# Patient Record
Sex: FEMALE | Race: BLACK OR AFRICAN AMERICAN | Employment: STUDENT | URBAN - METROPOLITAN AREA
[De-identification: names, ages, dates, MRNs, and addresses within clinical notes are randomized per-mention and may not be internally consistent; named-entity substitution may affect disease eponyms.]

---
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
## 2021-01-09 ENCOUNTER — HEALTH MAINTENANCE LETTER (OUTPATIENT)
Age: 21
End: 2021-01-09

## 2021-01-25 ENCOUNTER — APPOINTMENT (OUTPATIENT)
Dept: URGENT CARE | Facility: CLINIC | Age: 21
End: 2021-01-25
Payer: COMMERCIAL

## 2021-01-26 ENCOUNTER — HOSPITAL ENCOUNTER (OUTPATIENT)
Facility: CLINIC | Age: 21
Setting detail: OBSERVATION
Discharge: HOME | End: 2021-01-27
Attending: PHYSICIAN ASSISTANT | Admitting: SURGERY
Payer: COMMERCIAL

## 2021-01-26 ENCOUNTER — ANESTHESIA (OUTPATIENT)
Dept: SURGERY | Facility: CLINIC | Age: 21
End: 2021-01-26
Payer: COMMERCIAL

## 2021-01-26 ENCOUNTER — APPOINTMENT (OUTPATIENT)
Dept: CT IMAGING | Facility: CLINIC | Age: 21
End: 2021-01-26
Attending: PHYSICIAN ASSISTANT
Payer: COMMERCIAL

## 2021-01-26 ENCOUNTER — ANESTHESIA EVENT (OUTPATIENT)
Dept: SURGERY | Facility: CLINIC | Age: 21
End: 2021-01-26
Payer: COMMERCIAL

## 2021-01-26 DIAGNOSIS — M46.28 ABSCESS OF SACRUM (H): ICD-10-CM

## 2021-01-26 DIAGNOSIS — L05.01 PILONIDAL ABSCESS: Primary | ICD-10-CM

## 2021-01-26 LAB
ANION GAP SERPL CALCULATED.3IONS-SCNC: 7 MMOL/L (ref 3–14)
B-HCG FREE SERPL-ACNC: <5 IU/L
BACTERIA SPEC CULT: NORMAL
BACTERIA SPEC CULT: NORMAL
BASOPHILS # BLD AUTO: 0 10E9/L (ref 0–0.2)
BASOPHILS NFR BLD AUTO: 0.1 %
BUN SERPL-MCNC: 5 MG/DL (ref 7–30)
CALCIUM SERPL-MCNC: 8.7 MG/DL (ref 8.5–10.1)
CHLORIDE SERPL-SCNC: 105 MMOL/L (ref 96–110)
CO2 SERPL-SCNC: 24 MMOL/L (ref 20–32)
CREAT SERPL-MCNC: 0.54 MG/DL (ref 0.5–1)
DIFFERENTIAL METHOD BLD: ABNORMAL
EOSINOPHIL # BLD AUTO: 0 10E9/L (ref 0–0.7)
EOSINOPHIL NFR BLD AUTO: 0.1 %
ERYTHROCYTE [DISTWIDTH] IN BLOOD BY AUTOMATED COUNT: 13 % (ref 10–15)
GFR SERPL CREATININE-BSD FRML MDRD: >90 ML/MIN/{1.73_M2}
GLUCOSE SERPL-MCNC: 112 MG/DL (ref 70–99)
GRAM STN SPEC: ABNORMAL
HCT VFR BLD AUTO: 30.4 % (ref 35–47)
HGB BLD-MCNC: 9.9 G/DL (ref 11.7–15.7)
IMM GRANULOCYTES # BLD: 0.1 10E9/L (ref 0–0.4)
IMM GRANULOCYTES NFR BLD: 0.6 %
LABORATORY COMMENT REPORT: NORMAL
LACTATE BLD-SCNC: 0.6 MMOL/L (ref 0.7–2)
LYMPHOCYTES # BLD AUTO: 1.7 10E9/L (ref 0.8–5.3)
LYMPHOCYTES NFR BLD AUTO: 11.1 %
Lab: ABNORMAL
MCH RBC QN AUTO: 28.7 PG (ref 26.5–33)
MCHC RBC AUTO-ENTMCNC: 32.6 G/DL (ref 31.5–36.5)
MCV RBC AUTO: 88 FL (ref 78–100)
MONOCYTES # BLD AUTO: 1.6 10E9/L (ref 0–1.3)
MONOCYTES NFR BLD AUTO: 10.1 %
NEUTROPHILS # BLD AUTO: 12.2 10E9/L (ref 1.6–8.3)
NEUTROPHILS NFR BLD AUTO: 78 %
NRBC # BLD AUTO: 0 10*3/UL
NRBC BLD AUTO-RTO: 0 /100
PLATELET # BLD AUTO: 243 10E9/L (ref 150–450)
POTASSIUM SERPL-SCNC: 3.7 MMOL/L (ref 3.4–5.3)
RBC # BLD AUTO: 3.45 10E12/L (ref 3.8–5.2)
SARS-COV-2 RNA RESP QL NAA+PROBE: NEGATIVE
SODIUM SERPL-SCNC: 136 MMOL/L (ref 133–144)
SPECIMEN SOURCE: ABNORMAL
SPECIMEN SOURCE: NORMAL
SPECIMEN SOURCE: NORMAL
WBC # BLD AUTO: 15.6 10E9/L (ref 4–11)

## 2021-01-26 PROCEDURE — 87070 CULTURE OTHR SPECIMN AEROBIC: CPT | Performed by: SURGERY

## 2021-01-26 PROCEDURE — 83605 ASSAY OF LACTIC ACID: CPT | Performed by: PHYSICIAN ASSISTANT

## 2021-01-26 PROCEDURE — 72193 CT PELVIS W/DYE: CPT

## 2021-01-26 PROCEDURE — 85025 COMPLETE CBC W/AUTO DIFF WBC: CPT | Performed by: PHYSICIAN ASSISTANT

## 2021-01-26 PROCEDURE — 250N000013 HC RX MED GY IP 250 OP 250 PS 637: Performed by: SURGERY

## 2021-01-26 PROCEDURE — 96376 TX/PRO/DX INJ SAME DRUG ADON: CPT

## 2021-01-26 PROCEDURE — 87040 BLOOD CULTURE FOR BACTERIA: CPT | Performed by: PHYSICIAN ASSISTANT

## 2021-01-26 PROCEDURE — G0378 HOSPITAL OBSERVATION PER HR: HCPCS

## 2021-01-26 PROCEDURE — 250N000009 HC RX 250: Performed by: PHYSICIAN ASSISTANT

## 2021-01-26 PROCEDURE — 87635 SARS-COV-2 COVID-19 AMP PRB: CPT | Performed by: PHYSICIAN ASSISTANT

## 2021-01-26 PROCEDURE — 87077 CULTURE AEROBIC IDENTIFY: CPT | Performed by: SURGERY

## 2021-01-26 PROCEDURE — 87075 CULTR BACTERIA EXCEPT BLOOD: CPT | Performed by: SURGERY

## 2021-01-26 PROCEDURE — 258N000003 HC RX IP 258 OP 636: Performed by: SURGERY

## 2021-01-26 PROCEDURE — 250N000011 HC RX IP 250 OP 636: Performed by: PHYSICIAN ASSISTANT

## 2021-01-26 PROCEDURE — 250N000011 HC RX IP 250 OP 636: Performed by: NURSE ANESTHETIST, CERTIFIED REGISTERED

## 2021-01-26 PROCEDURE — 80048 BASIC METABOLIC PNL TOTAL CA: CPT | Performed by: PHYSICIAN ASSISTANT

## 2021-01-26 PROCEDURE — 96365 THER/PROPH/DIAG IV INF INIT: CPT

## 2021-01-26 PROCEDURE — 258N000003 HC RX IP 258 OP 636: Performed by: NURSE ANESTHETIST, CERTIFIED REGISTERED

## 2021-01-26 PROCEDURE — 99285 EMERGENCY DEPT VISIT HI MDM: CPT | Mod: 25

## 2021-01-26 PROCEDURE — 370N000017 HC ANESTHESIA TECHNICAL FEE, PER MIN: Performed by: SURGERY

## 2021-01-26 PROCEDURE — 710N000009 HC RECOVERY PHASE 1, LEVEL 1, PER MIN: Performed by: SURGERY

## 2021-01-26 PROCEDURE — 87205 SMEAR GRAM STAIN: CPT | Performed by: SURGERY

## 2021-01-26 PROCEDURE — 87070 CULTURE OTHR SPECIMN AEROBIC: CPT | Mod: XU | Performed by: SURGERY

## 2021-01-26 PROCEDURE — 272N000001 HC OR GENERAL SUPPLY STERILE: Performed by: SURGERY

## 2021-01-26 PROCEDURE — 360N000075 HC SURGERY LEVEL 2, PER MIN: Performed by: SURGERY

## 2021-01-26 PROCEDURE — C9803 HOPD COVID-19 SPEC COLLECT: HCPCS

## 2021-01-26 PROCEDURE — 250N000025 HC SEVOFLURANE, PER MIN: Performed by: SURGERY

## 2021-01-26 PROCEDURE — 99203 OFFICE O/P NEW LOW 30 MIN: CPT | Mod: 57 | Performed by: SURGERY

## 2021-01-26 PROCEDURE — 250N000011 HC RX IP 250 OP 636: Performed by: SURGERY

## 2021-01-26 PROCEDURE — 84702 CHORIONIC GONADOTROPIN TEST: CPT

## 2021-01-26 PROCEDURE — 250N000009 HC RX 250: Performed by: SURGERY

## 2021-01-26 PROCEDURE — 999N000141 HC STATISTIC PRE-PROCEDURE NURSING ASSESSMENT: Performed by: SURGERY

## 2021-01-26 PROCEDURE — 120N000001 HC R&B MED SURG/OB

## 2021-01-26 PROCEDURE — 250N000009 HC RX 250: Performed by: NURSE ANESTHETIST, CERTIFIED REGISTERED

## 2021-01-26 RX ORDER — SODIUM CHLORIDE, SODIUM LACTATE, POTASSIUM CHLORIDE, CALCIUM CHLORIDE 600; 310; 30; 20 MG/100ML; MG/100ML; MG/100ML; MG/100ML
INJECTION, SOLUTION INTRAVENOUS CONTINUOUS
Status: DISCONTINUED | OUTPATIENT
Start: 2021-01-26 | End: 2021-01-26 | Stop reason: HOSPADM

## 2021-01-26 RX ORDER — PIPERACILLIN SODIUM, TAZOBACTAM SODIUM 4; .5 G/20ML; G/20ML
4.5 INJECTION, POWDER, LYOPHILIZED, FOR SOLUTION INTRAVENOUS ONCE
Status: COMPLETED | OUTPATIENT
Start: 2021-01-26 | End: 2021-01-26

## 2021-01-26 RX ORDER — PIPERACILLIN SODIUM, TAZOBACTAM SODIUM 3; .375 G/15ML; G/15ML
3.38 INJECTION, POWDER, LYOPHILIZED, FOR SOLUTION INTRAVENOUS EVERY 6 HOURS
Status: DISCONTINUED | OUTPATIENT
Start: 2021-01-26 | End: 2021-01-27 | Stop reason: HOSPADM

## 2021-01-26 RX ORDER — DOXYCYCLINE HYCLATE 100 MG
100 TABLET ORAL 2 TIMES DAILY PRN
COMMUNITY

## 2021-01-26 RX ORDER — ONDANSETRON 2 MG/ML
INJECTION INTRAMUSCULAR; INTRAVENOUS PRN
Status: DISCONTINUED | OUTPATIENT
Start: 2021-01-26 | End: 2021-01-26

## 2021-01-26 RX ORDER — HYDROMORPHONE HYDROCHLORIDE 1 MG/ML
0.3 INJECTION, SOLUTION INTRAMUSCULAR; INTRAVENOUS; SUBCUTANEOUS
Status: DISCONTINUED | OUTPATIENT
Start: 2021-01-26 | End: 2021-01-27 | Stop reason: HOSPADM

## 2021-01-26 RX ORDER — ACETAMINOPHEN 325 MG/1
650 TABLET ORAL EVERY 4 HOURS PRN
Status: DISCONTINUED | OUTPATIENT
Start: 2021-01-29 | End: 2021-01-27 | Stop reason: HOSPADM

## 2021-01-26 RX ORDER — AMOXICILLIN 250 MG
1 CAPSULE ORAL 2 TIMES DAILY
Status: DISCONTINUED | OUTPATIENT
Start: 2021-01-26 | End: 2021-01-27 | Stop reason: HOSPADM

## 2021-01-26 RX ORDER — MAGNESIUM HYDROXIDE 1200 MG/15ML
LIQUID ORAL PRN
Status: DISCONTINUED | OUTPATIENT
Start: 2021-01-26 | End: 2021-01-26 | Stop reason: HOSPADM

## 2021-01-26 RX ORDER — DIPHENHYDRAMINE HCL 25 MG
25 CAPSULE ORAL EVERY 6 HOURS PRN
Status: DISCONTINUED | OUTPATIENT
Start: 2021-01-26 | End: 2021-01-27 | Stop reason: HOSPADM

## 2021-01-26 RX ORDER — NALOXONE HYDROCHLORIDE 0.4 MG/ML
0.4 INJECTION, SOLUTION INTRAMUSCULAR; INTRAVENOUS; SUBCUTANEOUS
Status: DISCONTINUED | OUTPATIENT
Start: 2021-01-26 | End: 2021-01-27 | Stop reason: HOSPADM

## 2021-01-26 RX ORDER — FENTANYL CITRATE 50 UG/ML
INJECTION, SOLUTION INTRAMUSCULAR; INTRAVENOUS PRN
Status: DISCONTINUED | OUTPATIENT
Start: 2021-01-26 | End: 2021-01-26

## 2021-01-26 RX ORDER — IBUPROFEN 600 MG/1
600 TABLET, FILM COATED ORAL EVERY 6 HOURS PRN
Status: DISCONTINUED | OUTPATIENT
Start: 2021-01-26 | End: 2021-01-27 | Stop reason: HOSPADM

## 2021-01-26 RX ORDER — ONDANSETRON 4 MG/1
4 TABLET, ORALLY DISINTEGRATING ORAL EVERY 30 MIN PRN
Status: DISCONTINUED | OUTPATIENT
Start: 2021-01-26 | End: 2021-01-26 | Stop reason: HOSPADM

## 2021-01-26 RX ORDER — DIPHENHYDRAMINE HYDROCHLORIDE 50 MG/ML
25 INJECTION INTRAMUSCULAR; INTRAVENOUS EVERY 6 HOURS PRN
Status: DISCONTINUED | OUTPATIENT
Start: 2021-01-26 | End: 2021-01-27 | Stop reason: HOSPADM

## 2021-01-26 RX ORDER — ONDANSETRON 4 MG/1
4 TABLET, ORALLY DISINTEGRATING ORAL EVERY 6 HOURS PRN
Status: DISCONTINUED | OUTPATIENT
Start: 2021-01-26 | End: 2021-01-27 | Stop reason: HOSPADM

## 2021-01-26 RX ORDER — SODIUM CHLORIDE, SODIUM LACTATE, POTASSIUM CHLORIDE, CALCIUM CHLORIDE 600; 310; 30; 20 MG/100ML; MG/100ML; MG/100ML; MG/100ML
INJECTION, SOLUTION INTRAVENOUS CONTINUOUS
Status: DISCONTINUED | OUTPATIENT
Start: 2021-01-26 | End: 2021-01-27

## 2021-01-26 RX ORDER — PROPOFOL 10 MG/ML
INJECTION, EMULSION INTRAVENOUS PRN
Status: DISCONTINUED | OUTPATIENT
Start: 2021-01-26 | End: 2021-01-26

## 2021-01-26 RX ORDER — FENTANYL CITRATE 50 UG/ML
25-50 INJECTION, SOLUTION INTRAMUSCULAR; INTRAVENOUS
Status: DISCONTINUED | OUTPATIENT
Start: 2021-01-26 | End: 2021-01-26 | Stop reason: HOSPADM

## 2021-01-26 RX ORDER — ACETAMINOPHEN 325 MG/1
975 TABLET ORAL EVERY 8 HOURS
Status: DISCONTINUED | OUTPATIENT
Start: 2021-01-26 | End: 2021-01-27 | Stop reason: HOSPADM

## 2021-01-26 RX ORDER — NALOXONE HYDROCHLORIDE 0.4 MG/ML
0.2 INJECTION, SOLUTION INTRAMUSCULAR; INTRAVENOUS; SUBCUTANEOUS
Status: DISCONTINUED | OUTPATIENT
Start: 2021-01-26 | End: 2021-01-27 | Stop reason: HOSPADM

## 2021-01-26 RX ORDER — ONDANSETRON 2 MG/ML
4 INJECTION INTRAMUSCULAR; INTRAVENOUS EVERY 6 HOURS PRN
Status: DISCONTINUED | OUTPATIENT
Start: 2021-01-26 | End: 2021-01-27 | Stop reason: HOSPADM

## 2021-01-26 RX ORDER — OXYCODONE HYDROCHLORIDE 5 MG/1
5-10 TABLET ORAL
Status: DISCONTINUED | OUTPATIENT
Start: 2021-01-26 | End: 2021-01-27 | Stop reason: HOSPADM

## 2021-01-26 RX ORDER — LIDOCAINE HYDROCHLORIDE 20 MG/ML
INJECTION, SOLUTION INFILTRATION; PERINEURAL PRN
Status: DISCONTINUED | OUTPATIENT
Start: 2021-01-26 | End: 2021-01-26

## 2021-01-26 RX ORDER — AMOXICILLIN 250 MG
2 CAPSULE ORAL 2 TIMES DAILY
Status: DISCONTINUED | OUTPATIENT
Start: 2021-01-26 | End: 2021-01-27 | Stop reason: HOSPADM

## 2021-01-26 RX ORDER — IOPAMIDOL 755 MG/ML
71 INJECTION, SOLUTION INTRAVASCULAR ONCE
Status: COMPLETED | OUTPATIENT
Start: 2021-01-26 | End: 2021-01-26

## 2021-01-26 RX ORDER — HYDROMORPHONE HYDROCHLORIDE 1 MG/ML
.3-.5 INJECTION, SOLUTION INTRAMUSCULAR; INTRAVENOUS; SUBCUTANEOUS EVERY 5 MIN PRN
Status: DISCONTINUED | OUTPATIENT
Start: 2021-01-26 | End: 2021-01-26 | Stop reason: HOSPADM

## 2021-01-26 RX ORDER — DEXAMETHASONE SODIUM PHOSPHATE 4 MG/ML
INJECTION, SOLUTION INTRA-ARTICULAR; INTRALESIONAL; INTRAMUSCULAR; INTRAVENOUS; SOFT TISSUE PRN
Status: DISCONTINUED | OUTPATIENT
Start: 2021-01-26 | End: 2021-01-26

## 2021-01-26 RX ORDER — LIDOCAINE 40 MG/G
CREAM TOPICAL
Status: DISCONTINUED | OUTPATIENT
Start: 2021-01-26 | End: 2021-01-27 | Stop reason: HOSPADM

## 2021-01-26 RX ORDER — ONDANSETRON 2 MG/ML
4 INJECTION INTRAMUSCULAR; INTRAVENOUS EVERY 30 MIN PRN
Status: DISCONTINUED | OUTPATIENT
Start: 2021-01-26 | End: 2021-01-26 | Stop reason: HOSPADM

## 2021-01-26 RX ADMIN — PHENYLEPHRINE HYDROCHLORIDE 100 MCG: 10 INJECTION INTRAVENOUS at 17:37

## 2021-01-26 RX ADMIN — PHENYLEPHRINE HYDROCHLORIDE 100 MCG: 10 INJECTION INTRAVENOUS at 17:29

## 2021-01-26 RX ADMIN — PIPERACILLIN SODIUM AND TAZOBACTAM SODIUM 4.5 G: 4; .5 INJECTION, POWDER, LYOPHILIZED, FOR SOLUTION INTRAVENOUS at 15:53

## 2021-01-26 RX ADMIN — DEXAMETHASONE SODIUM PHOSPHATE 4 MG: 4 INJECTION, SOLUTION INTRA-ARTICULAR; INTRALESIONAL; INTRAMUSCULAR; INTRAVENOUS; SOFT TISSUE at 17:39

## 2021-01-26 RX ADMIN — PHENYLEPHRINE HYDROCHLORIDE 100 MCG: 10 INJECTION INTRAVENOUS at 17:25

## 2021-01-26 RX ADMIN — MIDAZOLAM 2 MG: 1 INJECTION INTRAMUSCULAR; INTRAVENOUS at 17:10

## 2021-01-26 RX ADMIN — SODIUM CHLORIDE 61 ML: 9 INJECTION, SOLUTION INTRAVENOUS at 14:36

## 2021-01-26 RX ADMIN — LIDOCAINE HYDROCHLORIDE 80 MG: 20 INJECTION, SOLUTION INFILTRATION; PERINEURAL at 17:12

## 2021-01-26 RX ADMIN — ONDANSETRON 4 MG: 2 INJECTION INTRAMUSCULAR; INTRAVENOUS at 17:43

## 2021-01-26 RX ADMIN — SUCCINYLCHOLINE CHLORIDE 100 MG: 20 INJECTION, SOLUTION INTRAMUSCULAR; INTRAVENOUS; PARENTERAL at 17:12

## 2021-01-26 RX ADMIN — ACETAMINOPHEN 975 MG: 325 TABLET, FILM COATED ORAL at 19:28

## 2021-01-26 RX ADMIN — IOPAMIDOL 71 ML: 755 INJECTION, SOLUTION INTRAVENOUS at 14:36

## 2021-01-26 RX ADMIN — PROPOFOL 200 MG: 10 INJECTION, EMULSION INTRAVENOUS at 17:12

## 2021-01-26 RX ADMIN — PIPERACILLIN SODIUM AND TAZOBACTAM SODIUM 3.38 G: 3; .375 INJECTION, POWDER, LYOPHILIZED, FOR SOLUTION INTRAVENOUS at 22:08

## 2021-01-26 RX ADMIN — SODIUM CHLORIDE, POTASSIUM CHLORIDE, SODIUM LACTATE AND CALCIUM CHLORIDE: 600; 310; 30; 20 INJECTION, SOLUTION INTRAVENOUS at 19:29

## 2021-01-26 RX ADMIN — FENTANYL CITRATE 100 MCG: 50 INJECTION, SOLUTION INTRAMUSCULAR; INTRAVENOUS at 17:12

## 2021-01-26 RX ADMIN — PHENYLEPHRINE HYDROCHLORIDE 100 MCG: 10 INJECTION INTRAVENOUS at 17:32

## 2021-01-26 RX ADMIN — SODIUM CHLORIDE, POTASSIUM CHLORIDE, SODIUM LACTATE AND CALCIUM CHLORIDE: 600; 310; 30; 20 INJECTION, SOLUTION INTRAVENOUS at 16:54

## 2021-01-26 ASSESSMENT — ENCOUNTER SYMPTOMS
FEVER: 0
CHILLS: 0
COLOR CHANGE: 1

## 2021-01-26 ASSESSMENT — MIFFLIN-ST. JEOR
SCORE: 1451.74
SCORE: 1468.63

## 2021-01-26 ASSESSMENT — ACTIVITIES OF DAILY LIVING (ADL): ADLS_ACUITY_SCORE: 15

## 2021-01-26 NOTE — OR NURSING
Report received from FABBY Pagan RN. Pt to transfer over with zosyn infusion running to [facility].

## 2021-01-26 NOTE — H&P
"[facility] General Surgery H&P    Luisana Patterson MRN# [id number]   YOB: 2000 Age: 20 year old      Date of Admission:  1/26/2021  Date of Consult: 1/26/2021         Assessment and Plan:   Patient is a 20 year old female with an enlarging abscess on her lower back and history of pilonidal abscess. CT confirmed a 7cm underlying fluid collection consistent with pilonidal abscess. We discussed options and I recommend emergent excision and drainage of the abscess with irrigation and wound packing. Plan for admission following surgery for IV abx due to surrounding cellulitis and wound cares.     PLAN:  Surgery tonight  Ok for diet post operatively  IV abx  Cultures pending drainage  Wound cares with wound packing  Will likely need SW consult to determine if qualifies for home health RN for dressing changes         Requesting Physician:      Fatuma Crockett PA-C        Chief Complaint:   Pilonidal abscess          History of Present Illness:   Luisana Patterson is a 20 year old female who was seen in consultation at the request of Fatuma Crockett PA-C  who presented with a large pilonidal abscess. Pt reports that she began noticing some discomfort on her lower back about 6 days ago. She then noticed swelling in the area a couple days ago. It became more painful and she presented to the ER for evaluation. She is currently a student at [employer] and is from [place]. She has a history of prior pilonidal abscess in 2018 for which she underwent incision and drainage with wound packing at that time. She has not other prior surgeries. She is otherwise very healthy. She reports significant pain on her lower back/upper buttock and pain with walking.  Denies fevers but has felt feverish.           Physical Exam:   Blood pressure 110/68, pulse 120, temperature 98.9  F (37.2  C), temperature source Temporal, resp. rate 14, height 1.702 m (5' 7\"), weight 64.4 kg (142 lb), last menstrual period 01/09/2021, " SpO2 96 %.  142 lbs 0 oz  General: lying on her side in bed, appears uncomfortable  Psych: Alert and Oriented.  Normal affect  Neurological: grossly intact  Eyes: Sclera clear  Respiratory:  nonlabored breathing  Cardiovascular:  Regular Rate and Rhythm   GI: soft, nt, nt   Lymphatic/Hematologic/Immune:  No femoral or cervical lymphadenopathy.  Integumentary: area of significant erythema with underlying fluctuance noted above gluteal cleft at site of prior pilonidal abscess surgical scar with surrounding induration c/w cellulitis, fluctuance is 7-8cm on exam. Overlying skin is shiny/marco a in appearance.          Past Medical History:   History reviewed. No pertinent past medical history.         Past Surgical History:     Past Surgical History:   Procedure Laterality Date     EXCISE HIDRADENITIS (LOCATION) Bilateral 06/11/2020            Current Medications:           piperacillin-tazobactam  3.375 g Intravenous Q6H       diphenhydrAMINE **OR** diphenhydrAMINE, HYDROmorphone         Home Medications:     Prior to Admission medications    Medication Sig Last Dose Taking? Auth Provider   adalimumab (HUMIRA) 40 MG/0.8ML prefilled syringe kit Inject 40 mg Subcutaneous every 7 days On Fridays 1/25/2021 Yes Reported, Patient   diltiazem 2% in PLO gel Apply topically daily as needed Past Month at Unknown time Yes Unknown, Entered By History   doxycycline hyclate (VIBRA-TABS) 100 MG tablet Take 100 mg by mouth 2 times daily as needed 1/25/2021 at Unknown time Yes Reported, Patient   tretinoin (RETIN-A) 0.05 % external cream Apply topically nightly as needed  Past Month at Unknown time Yes Reported, Patient   SUMAtriptan (IMITREX) 50 MG tablet Take 50 mg by mouth at onset of headache Prescribed but never used it yet. not started at Unknown time  Reported, Patient            Allergies:   No Known Allergies         Family History:   History reviewed. No pertinent family history.        Social History:   Luisana Patterson  reports  that she has never smoked. She has never used smokeless tobacco. She reports that she does not drink alcohol or use drugs.          Review of Systems:   The 10 point Review of Systems is negative other than noted in the HPI.         Labs/Imaging   All new lab and imaging data was reviewed.   I have personally reviewed the imaging studies including her CT.         Sarah Ramsay MD

## 2021-01-26 NOTE — PHARMACY-ADMISSION MEDICATION HISTORY
Pharmacy Medication History  Admission medication history interview status for the 1/26/2021  admission is complete. See EPIC admission navigator for prior to admission medications     Location of Interview: Phone  Medication history sources: Patient  Medication history source reliability: Good  Adherence assessment: Good    Significant changes made to the medication list:      In the past week, patient estimated taking medication this percent of the time: greater than 90%    Additional medication history information:       Medication reconciliation completed by provider prior to medication history? No    Time spent in this activity: 5 min      Prior to Admission medications    Medication Sig Last Dose Taking? Auth Provider   adalimumab (HUMIRA) 40 MG/0.8ML prefilled syringe kit Inject 40 mg Subcutaneous every 7 days On Fridays 1/25/2021 Yes Reported, Patient   diltiazem 2% in PLO gel Apply topically daily as needed Past Month at Unknown time Yes Unknown, Entered By History   tretinoin (RETIN-A) 0.05 % external cream Apply topically nightly as needed  Past Month at Unknown time Yes Reported, Patient   SUMAtriptan (IMITREX) 50 MG tablet Take 50 mg by mouth at onset of headache Prescribed but never used it yet. not started at Unknown time  Reported, Patient

## 2021-01-26 NOTE — ANESTHESIA PREPROCEDURE EVALUATION
Anesthesia Pre-Procedure Evaluation    Patient: Luisana Patterson   MRN: [id number] : 2000        Preoperative Diagnosis: Pilonidal abscess [L05.01]   Procedure : Procedure(s):  EXCISION AND DRAINAGE, PILONIDAL ABCESS     History reviewed. No pertinent past medical history.   Past Surgical History:   Procedure Laterality Date     EXCISE HIDRADENITIS (LOCATION) Bilateral 2020      No Known Allergies   Social History     Tobacco Use     Smoking status: Never Smoker     Smokeless tobacco: Never Used   Substance Use Topics     Alcohol use: Never     Frequency: Never     Binge frequency: Never      Wt Readings from Last 1 Encounters:   21 64.4 kg (142 lb) (73 %, Z= 0.61)*     * Growth percentiles are based on CDC (Girls, 2-20 Years) data.        Anesthesia Evaluation            ROS/MED HX  ENT/Pulmonary:  - neg pulmonary ROS   (+) sleep apnea,     Neurologic:  - neg neurologic ROS     Cardiovascular:  - neg cardiovascular ROS     METS/Exercise Tolerance:     Hematologic:  - neg hematologic  ROS     Musculoskeletal:  - neg musculoskeletal ROS     GI/Hepatic:  - neg GI/hepatic ROS  (-) GERD   Renal/Genitourinary:  - neg Renal ROS     Endo:       Psychiatric/Substance Use:  - neg psychiatric ROS     Infectious Disease: Comment: PILONIDAL CYST      Malignancy:       Other:            Physical Exam    Airway        Mallampati: II   TM distance: > 3 FB   Neck ROM: full   Mouth opening: > 3 cm    Respiratory Devices and Support         Dental  no notable dental history         Cardiovascular   cardiovascular exam normal          Pulmonary   pulmonary exam normal                OUTSIDE LABS:  CBC:   Lab Results   Component Value Date    WBC 15.6 (H) 2021    HGB 9.9 (L) 2021    HCT 30.4 (L) 2021     2021     BMP:   Lab Results   Component Value Date     2021    POTASSIUM 3.7 2021    CHLORIDE 105 2021    CO2 24 2021    BUN 5 (L) 2021    CR 0.54  01/26/2021     (H) 01/26/2021     COAGS: No results found for: PTT, INR, FIBR  POC: No results found for: BGM, HCG, HCGS  HEPATIC: No results found for: ALBUMIN, PROTTOTAL, ALT, AST, GGT, ALKPHOS, BILITOTAL, BILIDIRECT, FARIBA  OTHER:   Lab Results   Component Value Date    LACT 0.6 (L) 01/26/2021    THEA 8.7 01/26/2021       Anesthesia Plan     History & Physical Review      ASA Status:  1. emergent. NPO Status:  NPO Appropriate. .  Plan for General with Intravenous and Propofol induction. Device: ETT Maintenance will be Balanced.         PONV prophylaxis:  Ondansetron (or other 5HT-3) and Dexamethasone or Solumedrol.       Consents  Anesthesia Plan(s) and associated risks, benefits, and realistic alternatives discussed.    Questions answered and patient/representative(s) expressed understanding.    Discussed with:  Patient.             Postoperative Care  Postoperative pain management: IV analgesics.           Salvador June, DO, DO

## 2021-01-26 NOTE — ED PROVIDER NOTES
"  History   Chief Complaint:  Infection    HPI   Luisana Patterson is a 20 year old female who presents with an abscess on her lower back.  The patient reports that around 6 days ago she started feeling pain and tenderness in her lower back.  She explains that she has a history of abscesses and had her last one surgically drained in 2018 at [facility].  No fever, chills or other symptoms.    Review of Systems   Constitutional: Negative for chills and fever.   Skin: Positive for color change.        Abscess on lower back.   All other systems reviewed and are negative.    Medications:  Imitrex    Past Medical History:    The patient denies any medical problems.      Social History:  Presents to ED alone via independent car  PCP: Carey Ferrari    Physical Exam     Patient Vitals for the past 24 hrs:   BP Temp Temp src Pulse Resp SpO2 Height Weight   01/26/21 1853 -- -- -- -- -- 100 % -- --   01/26/21 1840 101/61 -- -- 103 23 100 % -- --   01/26/21 1830 101/58 -- -- 103 22 100 % -- --   01/26/21 1820 113/65 97.7  F (36.5  C) Temporal 103 27 100 % -- --   01/26/21 1810 104/64 -- -- 100 26 100 % -- --   01/26/21 1800 110/66 -- -- -- 12 -- -- --   01/26/21 1757 95/56 97.6  F (36.4  C) Temporal -- 16 100 % -- --   01/26/21 1617 110/68 98.9  F (37.2  C) Temporal -- 14 96 % -- --   01/26/21 1518 -- -- -- -- -- 99 % -- --   01/26/21 1219 113/64 97.7  F (36.5  C) Temporal 120 16 98 % 1.702 m (5' 7\") 64.4 kg (142 lb)       Physical Exam  General: Alert and interactive. Appears well. Cooperative and pleasant.   Eyes: The pupils are equal and round. EOMs intact. No scleral icterus.  ENT: No abnormalities to the external nose or ears. Mucous membranes moist. Posterior oropharynx is non-erythematous.  Neck: Trachea is in the midline. No nuchal rigidity.     CV: Tachycardia. S1 and S2 normal without murmur, click, gallop or rub.   Resp: Breath sounds are clear bilaterally, without rhonchi, wheezes, rales. Non-labored, no retractions or " accessory muscle use.     GI: Abdomen is soft without distension. No tenderness to palpation. No peritoneal signs.    MS: Moving all extremities well. Good muscle tone.   Skin: Warm and dry. Large area of swelling, erythema to the pilonidal region, midline lower back near sacrum. There is a previous incision midline. There is a large indurated and fluctuance area, about 7 cm in diameter.   Neuro: Alert and oriented x 3. No focal neurologic deficits. Good strength and sensation in upper and lower extremities. Psych: Awake. Alert.  Normal affect. Appropriate interactions.  Lymph: No anterior or posterior cervical lymphadenopathy noted.    Emergency Department Course   Imaging:  CT Pelvis Soft Tissue w Contrast  Large complex fluid collection overlying the region of  the sacrum, possibly an infected seroma, hematoma or abscess.  Reading per radiology     Laboratory:  CBC: WBC 15.6 (H), HGB 9.9 (L),   BMP: Glucose 112 (H), BUN 5 (L) o/w WNL (Creatinine 0.54)    ISTAT quantitative: <5.0  Lactic acid (Resulted 1441): 0.6 (L)  Blood Culture x2: Pending     Asymptomatic COVID-19 virus (Coronavirus) by PCR In process     Emergency Department Course:    Reviewed:  1230 I reviewed the patient's nursing notes, vitals, past medical records, Care Everywhere.     Assessments:  1253 I performed an exam of the patient as documented above.   1525 I rechecked the patient and reevaluated their symptoms.    Consults:   1522 I spoke with Dr. Ramsay of the general surgery service at [facility] regarding the patient's symptoms and treatment options.     Interventions:  1553 Zosyn 4.5 g IV    Disposition:  The patient was admitted to the hospital under the care of Dr. Ramsay.     Impression & Plan   Covid-19  Luisana Patterson was evaluated during a global COVID-19 pandemic, which necessitated consideration that the patient might be at risk for infection with the SARS-CoV-2 virus that causes COVID-19.    Applicable protocols for evaluation were followed during the patient's care.   COVID-19 was considered as part of the patient's evaluation. The plan for testing is:  a test was obtained during this visit.    Medical Decision Making:  Luisana Patterson is a 20 year old female with a history of pilonidal cyst who presents for evaluation of an abscess to the lower back.  She states that she had this drained surgically in the operating room in [facility] a few years ago. The patient has a significant amount of swelling to the lower back consistent with a new pilonidal abscess. The area of induration and fluctuance was very large on exam, and I felt as though she needed CT scan for better characterization. This did confirm a very large abscess near the sacrum that will need surgical drainage. Discussed the case with Dr. Tovar, general surgery, who agreed to take the patient to the operating room for incision and drainage. The patient's labs here show a leukocytosis but her lactic acid is negative and there is no signs of sepsis at this time. Covid swab was obtained prior to surgery and is negative. Blood cultures are pending at this time. The general surgeon asked me to start the patient empirically on Zosyn prior to going to the operating room. Dr. Ramsay will admit after surgery. Observation bed order placed.     Diagnosis:    ICD-10-CM    1. Pilonidal abscess  L05.01 Blood culture     Blood culture     Asymptomatic SARS-CoV-2 COVID-19 Virus (Coronavirus) by PCR     Case Request: Excision and drainage of pilonidal abscess     Case Request: Excision and drainage of pilonidal abscess     Abscess Culture Aerobic Bacterial     Abscess Culture Aerobic Bacterial     Anaerobic bacterial culture     Anaerobic bacterial culture     Gram stain     Gram stain   2. Abscess of sacrum (H)  M46.28      Scribe Disclosure:  Nathaniel MERCHANT, am serving as a scribe at 12:42 PM on 1/26/2021 to document services personally  performed by Fatuma Ash PA-C based on my observations and the provider's statements to me.      Fatuma Ash PA-C  01/26/21 1913

## 2021-01-26 NOTE — ED NOTES
[facility]  ED Nurse Handoff Report    ED Chief complaint: No chief complaint on file.      ED Diagnosis:   Final diagnoses:   Abscess of sacrum (H)       Code Status: Full Code    Allergies: No Known Allergies    Patient Story: Presents reporting an abscess in her lower back, hx of similar presentation  Focused Assessment:  Alert and oriented x4. In room air. Independent at baseline. CT confirmed abscess in lumbar region. No focal deficits, bladder or bowel incontinence. 20 G in R AC.    Labs Ordered and Resulted from Time of ED Arrival Up to the Time of Departure from the ED   CBC WITH PLATELETS DIFFERENTIAL - Abnormal; Notable for the following components:       Result Value    WBC 15.6 (*)     RBC Count 3.45 (*)     Hemoglobin 9.9 (*)     Hematocrit 30.4 (*)     Absolute Neutrophil 12.2 (*)     Absolute Monocytes 1.6 (*)     All other components within normal limits   BASIC METABOLIC PANEL - Abnormal; Notable for the following components:    Glucose 112 (*)     Urea Nitrogen 5 (*)     All other components within normal limits   LACTIC ACID WHOLE BLOOD - Abnormal; Notable for the following components:    Lactic Acid 0.6 (*)     All other components within normal limits   SARS-COV-2 (COVID-19) VIRUS RT-PCR   PERIPHERAL IV CATHETER   ISTAT HCG QUANTITATIVE PREGNANCY NURSING POCT   ISTAT HCG QUANTITATIVE PREGNANCY POCT   BLOOD CULTURE   BLOOD CULTURE     CT Pelvis Soft Tissue w Contrast   Final Result   IMPRESSION:  Large complex fluid collection overlying the region of   the sacrum, possibly an infected seroma, hematoma or abscess.      MAIRA STEWART MD            Treatments and/or interventions provided: Antibiotics  Patient's response to treatments and/or interventions: tolerated well    To be done/followed up on inpatient unit:  Continue with plan of care per admitting MD.    Does this patient have any cognitive concerns?: N/A    Activity level - Baseline/Home:  Independent  Activity Level -  "Current:   Independent    Patient's Preferred language: English   Needed?: No    Isolation: None  Infection: Not Applicable  Patient tested for COVID 19 prior to admission: YES  Bariatric?: No    Vital Signs:   Vitals:    01/26/21 1219 01/26/21 1518   BP: 113/64    Pulse: 120    Resp: 16    Temp: 97.7  F (36.5  C)    TempSrc: Temporal    SpO2: 98% 99%   Weight: 64.4 kg (142 lb)    Height: 1.702 m (5' 7\")        Cardiac Rhythm:     Was the PSS-3 completed:   Yes  What interventions are required if any?               Family Comments: No family at bedside  OBS brochure/video discussed/provided to patient/family: N/A                For the majority of the shift this patient's behavior was Green.     ED NURSE PHONE NUMBER: [phone]         "

## 2021-01-27 VITALS
SYSTOLIC BLOOD PRESSURE: 93 MMHG | RESPIRATION RATE: 17 BRPM | WEIGHT: 145.72 LBS | OXYGEN SATURATION: 99 % | HEART RATE: 62 BPM | DIASTOLIC BLOOD PRESSURE: 57 MMHG | HEIGHT: 67 IN | TEMPERATURE: 97.5 F | BODY MASS INDEX: 22.87 KG/M2

## 2021-01-27 LAB
GLUCOSE BLDC GLUCOMTR-MCNC: 143 MG/DL (ref 70–99)
GRAM STN SPEC: ABNORMAL
SPECIMEN SOURCE: ABNORMAL

## 2021-01-27 PROCEDURE — 96376 TX/PRO/DX INJ SAME DRUG ADON: CPT

## 2021-01-27 PROCEDURE — 96375 TX/PRO/DX INJ NEW DRUG ADDON: CPT

## 2021-01-27 PROCEDURE — 250N000013 HC RX MED GY IP 250 OP 250 PS 637: Performed by: SURGERY

## 2021-01-27 PROCEDURE — 999N001017 HC STATISTIC GLUCOSE BY METER IP

## 2021-01-27 PROCEDURE — G0378 HOSPITAL OBSERVATION PER HR: HCPCS

## 2021-01-27 PROCEDURE — 250N000011 HC RX IP 250 OP 636: Performed by: SURGERY

## 2021-01-27 PROCEDURE — 258N000003 HC RX IP 258 OP 636: Performed by: SURGERY

## 2021-01-27 RX ORDER — ACETAMINOPHEN 325 MG/1
650 TABLET ORAL EVERY 4 HOURS PRN
Qty: 60 TABLET | Refills: 1 | Status: SHIPPED | OUTPATIENT
Start: 2021-01-29

## 2021-01-27 RX ORDER — AMOXICILLIN 250 MG
1 CAPSULE ORAL 2 TIMES DAILY
Qty: 60 TABLET | Refills: 1 | Status: SHIPPED | OUTPATIENT
Start: 2021-01-27

## 2021-01-27 RX ORDER — OXYCODONE HYDROCHLORIDE 5 MG/1
5-10 TABLET ORAL
Qty: 30 TABLET | Refills: 0 | Status: SHIPPED | OUTPATIENT
Start: 2021-01-27

## 2021-01-27 RX ADMIN — DOCUSATE SODIUM 50 MG AND SENNOSIDES 8.6 MG 1 TABLET: 8.6; 5 TABLET, FILM COATED ORAL at 08:18

## 2021-01-27 RX ADMIN — HYDROMORPHONE HYDROCHLORIDE 0.3 MG: 1 INJECTION, SOLUTION INTRAMUSCULAR; INTRAVENOUS; SUBCUTANEOUS at 08:18

## 2021-01-27 RX ADMIN — PIPERACILLIN SODIUM AND TAZOBACTAM SODIUM 3.38 G: 3; .375 INJECTION, POWDER, LYOPHILIZED, FOR SOLUTION INTRAVENOUS at 03:26

## 2021-01-27 RX ADMIN — PIPERACILLIN SODIUM AND TAZOBACTAM SODIUM 3.38 G: 3; .375 INJECTION, POWDER, LYOPHILIZED, FOR SOLUTION INTRAVENOUS at 09:39

## 2021-01-27 RX ADMIN — SODIUM CHLORIDE, POTASSIUM CHLORIDE, SODIUM LACTATE AND CALCIUM CHLORIDE: 600; 310; 30; 20 INJECTION, SOLUTION INTRAVENOUS at 04:50

## 2021-01-27 RX ADMIN — ACETAMINOPHEN 975 MG: 325 TABLET, FILM COATED ORAL at 09:56

## 2021-01-27 RX ADMIN — ACETAMINOPHEN 975 MG: 325 TABLET, FILM COATED ORAL at 03:26

## 2021-01-27 ASSESSMENT — ACTIVITIES OF DAILY LIVING (ADL)
ADLS_ACUITY_SCORE: 16

## 2021-01-27 NOTE — OP NOTE
General Surgery Operative Note    PREOPERATIVE DIAGNOSIS: pilonidal abscess    POSTOPERATIVE DIAGNOSIS: pilonidal abscess    PROCEDURE: excision and drainage of pilonidal abscess    SURGEON:  Sarah Ramsay MD    ASSISTANT:  NONE    ANESTHESIA:  General.    BLOOD LOSS: 5ml    FINDINGS: large pilonidal abscess with 500ml of purulent fluid in abscess cavity. Fluid and tissue sent for culture    INDICATIONS:   Ms. Patterson presented with pain on her lower back/upper buttock and h/o prior pilonidal abscess with CT confirmed large pilonidal abscess and elevated WBC. We discussed options and she elected to proceed with surgical excision and debridement.     DETAILS OF PROCEDURE: The patient was brought to the operating room per Anesthesia, placed in supine position, and intubated without difficulty. She was then positioned prone on the operating room table. All pressure points were padded. There was a large area of fluctuance just superior to the gluteal cleft and this area was prepped with betadine. Sterile towels and drapes were placed. A Surgical timeout was then performed, verifying the correct surgeon, site, procedure, and patient and all in the room were in agreement.     I examined the area of the pilonidal abscess. There were no large pores or pits noted inferiorly or superiorly. There were three very small pits just inferior to her prior surgical scar. I used a 15 blade to make in incision over her prior scar on the lower back overlying the maximal point of fluctuance. Cautery was used to divide the subcutaneous tissue and I entered the large abscess cavity. 500ml of purulent foul smelling fluid was evacuated. Fluid was sent for culture. There was granulation tissue present at the base of the wound and this was excised and sent for tissue culture. The wound was probed and all septations taken down digitally. The wound was then irrigated with sterile saline and with betadine saline until the irrigant was clean. The  wound was then packed with a 2inch packing gauze. 4x4 gauze and an ABD were placed. All instrument, needle and sponge counts were correct at the conclusion of the procedure. Pt tolerated the procedure well and was taken to PACU in stable condition.       Sarah Ramsay MD

## 2021-01-27 NOTE — PLAN OF CARE
"DATE & TIME: 1/26/21 [id number]  Cognitive Concerns/ Orientation: A&Ox4  BEHAVIOR & AGGRESSION TOOL COLOR: Green  CIWA SCORE: NA   ABNL VS/O2: VSS on RA. Pt on capno in room; no concerns. Remains afebrile.   MOBILITY: SBA d/t cords and pumps, steady, denies dizzy/lightheadedness.  PAIN MANAGMENT: Scheduled Tylenol given  DIET: Regular   BOWEL/BLADDER: Continent, up to BR.  ABNL LAB/BG: WBC 15.6, Hgb 9.9  DRAIN/DEVICES: PIV Infusing LR @ 125ml/hr   TELEMETRY RHYTHM: N/A  SKIN: Wound to sacrum; dressing in place. Pt reports feeling \"wet\" around dressing area. Appears to have slight drainage under dressing, but none that is soaking through. Surgeon to due first dressing change per POC.  TESTS/PROCEDURES: I&D under general anesthesia yesterday 1/26 of Pilonidal abscess  D/C DAY/GOALS/PLACE: Pending  OTHER IMPORTANT INFO: hx of pilonidal cysts. Had 500cc of fluid removed.   "

## 2021-01-27 NOTE — DISCHARGE SUMMARY
[facility] Discharge Summary    Luisana Patterson MRN# [id number]   Age: 20 year old YOB: 2000     Date of Admission:  1/26/2021  Date of Discharge:  1/27/2021  Admitting Provider:  Sarah Ramsay MD  Discharge Provider:  Sarah Ramsay MD  Discharging Service: General Surgery     Primary Provider: Carey Ferrari  Primary Care Physician Phone Number: [phone]          Admission Diagnoses:   Principle Diagnosis: Pilonidal abscess [L05.01]  Abscess of sacrum (H) [M46.28]  Secondary Diagnoses:          Discharge Diagnosis:   Pilonidal abscess [L05.01]          Procedures:   Procedure(s): Excision and drainage of pilonidal abscess            Discharge Medications:     Current Discharge Medication List      START taking these medications    Details   acetaminophen (TYLENOL) 325 MG tablet Take 2 tablets (650 mg) by mouth every 4 hours as needed for other (multimodal surgical pain management along with NSAIDS and opioid medication as indicated based on pain control and physical function.)  Qty: 60 tablet, Refills: 1    Associated Diagnoses: Pilonidal abscess      oxyCODONE (ROXICODONE) 5 MG tablet Take 1-2 tablets (5-10 mg) by mouth every 3 hours as needed for severe pain  Qty: 30 tablet, Refills: 0    Associated Diagnoses: Pilonidal abscess      senna-docusate (SENOKOT-S/PERICOLACE) 8.6-50 MG tablet Take 1 tablet by mouth 2 times daily  Qty: 60 tablet, Refills: 1    Associated Diagnoses: Pilonidal abscess         CONTINUE these medications which have NOT CHANGED    Details   adalimumab (HUMIRA) 40 MG/0.8ML prefilled syringe kit Inject 40 mg Subcutaneous every 7 days On Fridays      diltiazem 2% in PLO gel Apply topically daily as needed    Comments: Dispense in dosing applicator. 1 click = 0.25g of product.      doxycycline hyclate (VIBRA-TABS) 100 MG tablet Take 100 mg by mouth 2 times daily as needed      tretinoin (RETIN-A) 0.05 % external cream Apply topically nightly as needed      "  SUMAtriptan (IMITREX) 50 MG tablet Take 50 mg by mouth at onset of headache Prescribed but never used it yet.                 Allergies:       No Known Allergies          Brief History of Illness:   Luisana Patterson presented with a large pilonidal abscess.     After discussing the risks, benefits, and possible complications, informed consent was obtained and the patient underwent the above procedure.  There were no complications.  Please see the Operative Report for full details.           Hospital Course:   Luisana Patterson's hospital course was unremarkable.  She recovered as anticipated and experienced no post-operative complications.     On the date of discharge, the patient was discharged to home in stable condition and afebrile.  She verbalized understanding of all discharge instructions and felt comfortable with the discharge plan.  She was asked to call with any further questions or concerns.         Condition on Discharge:      Discharge condition: Stable   Discharge vitals: Blood pressure 93/57, pulse 62, temperature 97.5  F (36.4  C), temperature source Oral, resp. rate 17, height 1.702 m (5' 7\"), weight 66.1 kg (145 lb 11.6 oz), last menstrual period 01/09/2021, SpO2 99 %.           Discharge Disposition:   Discharged to home          Discharge Instructions and Follow-Up:      Luisana Patterson was asked to follow up with surgical team in 1day.      Sarah Ramsay MD  [facility]          "

## 2021-01-27 NOTE — ANESTHESIA CARE TRANSFER NOTE
Patient: Luisana Patterson    Procedure(s):  EXCISION AND DRAINAGE, PILONIDAL ABCESS    Diagnosis: Pilonidal abscess [L05.01]  Diagnosis Additional Information: No value filed.    Anesthesia Type:   General     Note:    Oropharynx: oropharynx clear of all foreign objects and spontaneously breathing  Level of Consciousness: drowsy  Oxygen Supplementation: face mask  Level of Supplemental Oxygen: 6  Independent Airway: airway patency satisfactory and stable  Dentition: dentition unchanged  Vital Signs Stable: post-procedure vital signs reviewed and stable  Report to RN Given: handoff report given  Patient transferred to: PACU    Handoff Report: Identifed the Patient, Identified the Reponsible Provider, Reviewed the pertinent medical history, Discussed the surgical course, Reviewed Intra-OP anesthesia mangement and issues during anesthesia, Set expectations for post-procedure period and Allowed opportunity for questions and acknowledgement of understanding      Vitals: (Last set prior to Anesthesia Care Transfer)  CRNA VITALS  1/26/2021 1725 - 1/26/2021 1802      1/26/2021             NIBP:  103/60    Pulse:  115    NIBP Mean:  74    SpO2:  100 %    Resp Rate (observed):  12    Resp Rate (set):  10        Electronically Signed By: KATALINA Nixon CRNA  January 26, 2021  6:02 PM

## 2021-01-27 NOTE — UTILIZATION REVIEW
"[facility]     Admission Status; Secondary Review Determination     Admission Date: 1/26/2021 12:13 PM      Under the authority of the Utilization Management Committee, the utilization review process indicated a secondary review on the above patient.  The review outcome is based on review of the medical records, discussions with staff, and applying clinical experience noted on the date of the review.          (x) Observation Status Appropriate - This patient does not meet hospital inpatient criteria and is placed in observation status. If this patient's primary payer is Medicare and was admitted as an inpatient, Condition Code 44 should be used and patient status changed to \"observation\".     RATIONALE FOR DETERMINATION   20-year-old female was admitted yesterday with a pilonidal cyst.  She went urgently to the operating room for excision and drainage of the abscess with 500 cc of purulent fluid expressed.  She was on Zosyn overnight and will be discharging today on simply analgesics.  At the time of this review the patient does not meet medical necessity for inpatient hospitalization and observation status is recommended.    The severity of illness, intensity of service provided, expected LOS and risk for adverse outcome make the care appropriate for further observation; however, doesn't meet criteria for hospital inpatient admission.  Dr Ramsay was paged by the answering service.        The information on this document is developed by the utilization review team in order for the business office to ensure compliance.  This only denotes the appropriateness of proper admission status and does not reflect the quality of care rendered.         The definitions of Inpatient Status and Observation Status used in making the determination above are those provided in the CMS Coverage Manual, Chapter 1 and Chapter 6, section 70.4.      Sincerely,     Von Us DO MPH   Physician Advisor  Utilization " Review  [facility]

## 2021-01-27 NOTE — PLAN OF CARE
"Summary:     DATE & TIME: 1/26/2020 [id number]  Cognitive Concerns/ Orientation: A&Ox4  BEHAVIOR & AGGRESSION TOOL COLOR: Green  CIWA SCORE: NA   ABNL VS/O2: VSS on RA. Pt on capno in rooms; no concerns. Remains afebrile.   MOBILITY: A1 d/t cords and pumps. Pt denies any lightheaded or dizziness upon standing for first time after surgery.   PAIN MANAGMENT: Scheduled Tylenol given. Educated on pain management options.   DIET: Regular   BOWEL/BLADDER: Continent, urinated after surgery. Ambulated to BR.   ABNL LAB/BG: WBC 15.6, Hgb 9.9  DRAIN/DEVICES: PIV Infusing LR @ 125ml/hr   TELEMETRY RHYTHM: NA  SKIN: Wound to sacrum; dressing in place. No dried drainage noted. Pt reported feeling like she was \"bleeding\". Dressing checked and no blood noted. Surgeon to due first dressing change per POC.  TESTS/PROCEDURES: I&D under general anesthesia today 1/26 of Pilonidal abscess  D/C DAY/GOALS/PLACE: Pending  OTHER IMPORTANT INFO: hx of pilonidal cysts. Had 500cc of fluid removed.   " Patient Transferred to: 10T  Handoff Report Given to: Joon CHANDLER RN

## 2021-01-27 NOTE — ANESTHESIA POSTPROCEDURE EVALUATION
Patient: Luisana Patterson    Procedure(s):  EXCISION AND DRAINAGE, PILONIDAL ABCESS    Diagnosis:Pilonidal abscess [L05.01]  Diagnosis Additional Information: No value filed.    Anesthesia Type:  General    Note:  Disposition: Inpatient   Postop Pain Control: Uneventful            Sign Out: Well controlled pain   PONV: No   Neuro/Psych: Uneventful            Sign Out: Acceptable/Baseline neuro status   Airway/Respiratory: Uneventful            Sign Out: Acceptable/Baseline resp. status   CV/Hemodynamics: Uneventful            Sign Out: Acceptable CV status   Other NRE: NONE   DID A NON-ROUTINE EVENT OCCUR? No         Last vitals:  Vitals:    01/26/21 1908 01/26/21 2000 01/26/21 2030   BP: 118/64     Pulse: 93     Resp: 16 18 16   Temp: 36.8  C (98.2  F)     SpO2: 100% 98% 99%       Electronically Signed By: Salvador June DO, DO  January 26, 2021  11:10 PM

## 2021-01-27 NOTE — PROGRESS NOTES
"General Surgery Progress Note    Subjective: pt doing fine. Pain controlled. Has felt like there has been drainage out of the dressing overnight.     Objective: BP 93/57 (BP Location: Left arm)   Pulse 62   Temp 97.5  F (36.4  C) (Oral)   Resp 17   Ht 1.702 m (5' 7\")   Wt 66.1 kg (145 lb 11.6 oz)   LMP 01/09/2021 (Exact Date)   SpO2 99%   BMI 22.82 kg/m    Gen: alert, no distress  CV: RRR  Pulm: nonlabored breathing  Abd: soft, nt  Buttock: dressing removed and saturated with serous fluid. Packing removed and wound irrigated. Repacked with 1inch packing gauze.   Ext: WWP    Assessment/Plan:   Luisana Patterson  is a 20 year old female POD 1 s/p excision and drainage of pilonidal abscess. Doing well. Unfortunately will not qualify for home health care and we discussed that she will return to my office for daily dressing changes this week and likely MWF next week.   - ok for discharge home today  - please send with extra 4x4 gauze and ABD pads for changing outer dressings.   - follow up in clinic tomorrow for dressing change    Sarah Ramsay MD  [facility]  [phone]              "

## 2021-01-27 NOTE — PROGRESS NOTES
Discharge    Patient discharged to home  via own transportation with friend.   Care plan note: vss, on RA, IND, mild pain, controlled by tylenol. Tolerating food. Discharge info given to pt. Pt verbalized understanding. Pt will call to reschedule dressing change for tomorrow at clinic (currently at 1315, but she had class during that time). Meds filled and given to pt. Pt discharged in stable condition.     Listed belongings gathered and returned to patient. Yes  Care Plan and Patient education resolved: Yes  Prescriptions if needed, hard copies sent with patient  NA  Home and hospital acquired medications returned to patient: NA  Medication Bin checked and emptied on discharge Yes  Follow up appointment made for patient: Yes

## 2021-01-28 ENCOUNTER — OFFICE VISIT (OUTPATIENT)
Dept: SURGERY | Facility: CLINIC | Age: 21
End: 2021-01-28
Payer: COMMERCIAL

## 2021-01-28 ENCOUNTER — TELEPHONE (OUTPATIENT)
Dept: SURGERY | Facility: CLINIC | Age: 21
End: 2021-01-28

## 2021-01-28 DIAGNOSIS — Z09 SURGERY FOLLOW-UP EXAMINATION: Primary | ICD-10-CM

## 2021-01-28 PROCEDURE — 99024 POSTOP FOLLOW-UP VISIT: CPT | Performed by: PHYSICIAN ASSISTANT

## 2021-01-28 NOTE — TELEPHONE ENCOUNTER
Patient was suppose to come in this morning for nurse visit and would like to know if the nurse is free to see her later today for a dressing change    Phone: [phone]

## 2021-01-29 ENCOUNTER — OFFICE VISIT (OUTPATIENT)
Dept: SURGERY | Facility: CLINIC | Age: 21
End: 2021-01-29
Payer: COMMERCIAL

## 2021-01-29 DIAGNOSIS — Z09 SURGERY FOLLOW-UP EXAMINATION: Primary | ICD-10-CM

## 2021-01-29 LAB
BACTERIA SPEC CULT: ABNORMAL
BACTERIA SPEC CULT: ABNORMAL
Lab: ABNORMAL
Lab: ABNORMAL
SPECIMEN SOURCE: ABNORMAL
SPECIMEN SOURCE: ABNORMAL

## 2021-01-29 PROCEDURE — 99207 PR NO CHARGE LOS: CPT

## 2021-01-29 NOTE — NURSING NOTE
Dressing change completed without issue.  Minimal drainage on bandages.    Saturated area with lidocaine prior to removing packing.    Per Dr. Ramsay, no need to continue with packing.    Patient should do two sitz baths daily (15-20 minutes each) and cover with gauze/abd pad.      Will follow up with Dr. Ramsay on Tuesday morning.    Brigitte Chen RN-BSN

## 2021-01-30 LAB
BACTERIA SPEC CULT: ABNORMAL
Lab: ABNORMAL
SPECIMEN SOURCE: ABNORMAL

## 2021-02-01 LAB
BACTERIA SPEC CULT: ABNORMAL
BACTERIA SPEC CULT: ABNORMAL
BACTERIA SPEC CULT: NO GROWTH
BACTERIA SPEC CULT: NO GROWTH
SPECIMEN SOURCE: ABNORMAL
SPECIMEN SOURCE: NORMAL
SPECIMEN SOURCE: NORMAL

## 2021-02-02 ENCOUNTER — OFFICE VISIT (OUTPATIENT)
Dept: SURGERY | Facility: CLINIC | Age: 21
End: 2021-02-02
Payer: COMMERCIAL

## 2021-02-02 DIAGNOSIS — Z09 SURGERY FOLLOW-UP EXAMINATION: Primary | ICD-10-CM

## 2021-02-02 DIAGNOSIS — Z09 SURGICAL FOLLOW-UP CARE: ICD-10-CM

## 2021-02-02 PROCEDURE — 99024 POSTOP FOLLOW-UP VISIT: CPT | Performed by: SURGERY

## 2021-02-02 NOTE — PROGRESS NOTES
Surgery Postoperative Note    S: Rashida is doing well over the weekend. She has been doing modified sitz baths and has had minimal drainage from the wound. Pain improving.     Buttock: incision just above the gluteal cleft is almost closed. There is granulation tissue at the base. This was oozing and treated with silver nitrate.     A/P  Luisana Patterson is recovering from an I&D of large pilonidal abscess. This is healing well. No need for further packing of wound. She will cover with dry dressing. She will continue with daily sitz type bathes. She will see me prn going forward. We did discuss that in 3-4 months after this has healed well, she should consider an appt to discuss excision of her pilonidal cyst as this has now occurred x2 and been more complicated. We briefly discussed surgical options for excision. She could also follow up in [place] if she is home for the summer.     Thank you for the opportunity to help in her care.    Sarah Ramsay MD  [facility]  [phone]    Please route or send letter to:  Primary Care Provider (PCP) and Referring Provider

## 2021-03-18 NOTE — TELEPHONE ENCOUNTER
FUTURE VISIT INFORMATION      FUTURE VISIT INFORMATION:    Date: 3.19.21    Time: 10:45    Location: [facility]  REFERRAL INFORMATION:    Referring provider:  Self    Referring providers clinic:  na    Reason for visit/diagnosis  Pt rescheduled 1/15/21 due to Covid travel quarantine. New HS//Scheduling protocols do not preclude re-scheduling Pts    RECORDS REQUESTED FROM:       Clinic name Comments Records Status Imaging Status   [facility] 2.2.21, 1.26.21  Dr. Sarah Ramsay    1.28.21  Alysia Irizarry PA-C    12.14.20  Dr. Marybeth Alfaro [facility] na   [facility]at[facility] 2.11.21, 1.7.21, 12.9.20 + more with  Dr. Serafin Doshi [facility]

## 2021-03-19 ENCOUNTER — OFFICE VISIT (OUTPATIENT)
Dept: DERMATOLOGY | Facility: CLINIC | Age: 21
End: 2021-03-19
Payer: COMMERCIAL

## 2021-03-19 ENCOUNTER — PRE VISIT (OUTPATIENT)
Dept: DERMATOLOGY | Facility: CLINIC | Age: 21
End: 2021-03-19

## 2021-03-19 DIAGNOSIS — Z51.81 MEDICATION MONITORING ENCOUNTER: ICD-10-CM

## 2021-03-19 DIAGNOSIS — Z51.81 MEDICATION MONITORING ENCOUNTER: Primary | ICD-10-CM

## 2021-03-19 DIAGNOSIS — L81.9 POST-INFLAMMATORY PIGMENTARY CHANGES: ICD-10-CM

## 2021-03-19 DIAGNOSIS — L30.9 DERMATITIS: ICD-10-CM

## 2021-03-19 DIAGNOSIS — L73.2 HIDRADENITIS SUPPURATIVA: ICD-10-CM

## 2021-03-19 DIAGNOSIS — L70.0 ACNE VULGARIS: ICD-10-CM

## 2021-03-19 LAB
ALBUMIN SERPL-MCNC: 4.1 G/DL (ref 3.4–5)
ALP SERPL-CCNC: 79 U/L (ref 40–150)
ALT SERPL W P-5'-P-CCNC: 18 U/L (ref 0–50)
ANION GAP SERPL CALCULATED.3IONS-SCNC: 4 MMOL/L (ref 3–14)
AST SERPL W P-5'-P-CCNC: 9 U/L (ref 0–35)
BASOPHILS # BLD AUTO: 0 10E9/L (ref 0–0.2)
BASOPHILS NFR BLD AUTO: 0.2 %
BILIRUB SERPL-MCNC: 0.6 MG/DL (ref 0.2–1.3)
BUN SERPL-MCNC: 9 MG/DL (ref 7–30)
CALCIUM SERPL-MCNC: 9.4 MG/DL (ref 8.5–10.1)
CHLORIDE SERPL-SCNC: 110 MMOL/L (ref 96–110)
CO2 SERPL-SCNC: 24 MMOL/L (ref 20–32)
CREAT SERPL-MCNC: 0.69 MG/DL (ref 0.5–1)
DIFFERENTIAL METHOD BLD: NORMAL
EOSINOPHIL # BLD AUTO: 0 10E9/L (ref 0–0.7)
EOSINOPHIL NFR BLD AUTO: 0.5 %
ERYTHROCYTE [DISTWIDTH] IN BLOOD BY AUTOMATED COUNT: 13.3 % (ref 10–15)
GFR SERPL CREATININE-BSD FRML MDRD: >90 ML/MIN/{1.73_M2}
GLUCOSE SERPL-MCNC: 86 MG/DL (ref 70–99)
HBA1C MFR BLD: 5 % (ref 0–5.6)
HCT VFR BLD AUTO: 37.8 % (ref 35–47)
HGB BLD-MCNC: 11.9 G/DL (ref 11.7–15.7)
IMM GRANULOCYTES # BLD: 0 10E9/L (ref 0–0.4)
IMM GRANULOCYTES NFR BLD: 0.4 %
LYMPHOCYTES # BLD AUTO: 2 10E9/L (ref 0.8–5.3)
LYMPHOCYTES NFR BLD AUTO: 35.8 %
MCH RBC QN AUTO: 28.5 PG (ref 26.5–33)
MCHC RBC AUTO-ENTMCNC: 31.5 G/DL (ref 31.5–36.5)
MCV RBC AUTO: 91 FL (ref 78–100)
MONOCYTES # BLD AUTO: 0.6 10E9/L (ref 0–1.3)
MONOCYTES NFR BLD AUTO: 11.1 %
NEUTROPHILS # BLD AUTO: 2.9 10E9/L (ref 1.6–8.3)
NEUTROPHILS NFR BLD AUTO: 52 %
NRBC # BLD AUTO: 0 10*3/UL
NRBC BLD AUTO-RTO: 0 /100
PLATELET # BLD AUTO: 221 10E9/L (ref 150–450)
POTASSIUM SERPL-SCNC: 4.2 MMOL/L (ref 3.4–5.3)
PROT SERPL-MCNC: 8.1 G/DL (ref 6.8–8.8)
RBC # BLD AUTO: 4.17 10E12/L (ref 3.8–5.2)
SODIUM SERPL-SCNC: 138 MMOL/L (ref 133–144)
WBC # BLD AUTO: 5.6 10E9/L (ref 4–11)

## 2021-03-19 PROCEDURE — 80053 COMPREHEN METABOLIC PANEL: CPT | Performed by: PATHOLOGY

## 2021-03-19 PROCEDURE — 85025 COMPLETE CBC W/AUTO DIFF WBC: CPT | Performed by: PATHOLOGY

## 2021-03-19 PROCEDURE — 36415 COLL VENOUS BLD VENIPUNCTURE: CPT | Performed by: PATHOLOGY

## 2021-03-19 PROCEDURE — 99204 OFFICE O/P NEW MOD 45 MIN: CPT | Mod: 25 | Performed by: DERMATOLOGY

## 2021-03-19 PROCEDURE — 11900 INJECT SKIN LESIONS </W 7: CPT | Performed by: DERMATOLOGY

## 2021-03-19 PROCEDURE — 83036 HEMOGLOBIN GLYCOSYLATED A1C: CPT | Performed by: PATHOLOGY

## 2021-03-19 RX ORDER — HEPARIN SODIUM (PORCINE) LOCK FLUSH IV SOLN 100 UNIT/ML 100 UNIT/ML
5 SOLUTION INTRAVENOUS
Status: CANCELLED | OUTPATIENT
Start: 2021-03-19

## 2021-03-19 RX ORDER — TRIAMCINOLONE ACETONIDE 0.25 MG/G
OINTMENT TOPICAL
Qty: 60 G | Refills: 3 | Status: SHIPPED | OUTPATIENT
Start: 2021-03-19

## 2021-03-19 RX ORDER — HEPARIN SODIUM,PORCINE 10 UNIT/ML
5 VIAL (ML) INTRAVENOUS
Status: CANCELLED | OUTPATIENT
Start: 2021-03-19

## 2021-03-19 RX ORDER — ACETAMINOPHEN 325 MG/1
650 TABLET ORAL ONCE
Status: CANCELLED
Start: 2021-03-19 | End: 2021-03-19

## 2021-03-19 RX ORDER — DIPHENHYDRAMINE HCL 25 MG
25 CAPSULE ORAL ONCE
Status: CANCELLED
Start: 2021-03-19 | End: 2021-03-19

## 2021-03-19 RX ORDER — METHYLPREDNISOLONE SODIUM SUCCINATE 40 MG/ML
40 INJECTION, POWDER, LYOPHILIZED, FOR SOLUTION INTRAMUSCULAR; INTRAVENOUS ONCE
Status: CANCELLED
Start: 2021-03-19

## 2021-03-19 ASSESSMENT — PAIN SCALES - GENERAL: PAINLEVEL: NO PAIN (0)

## 2021-03-19 NOTE — NURSING NOTE
Drug Administration Record    Prior to injection, verified patient identity using patient's name and date of birth.  Due to injection administration, patient instructed to remain in clinic for 15 minutes  afterwards, and to report any adverse reaction to me immediately.    Drug Name: triamcinolone acetonide(kenalog)  Dose: 1mL of triamcinolone 20mg/mL, 50mg dose  Route administered: ID  NDC #: Kenalog-10 (0513-2707-46)  Amount of waste(mL):4  Reason for waste: Single use vial         LOT #: [id number]  SITE: See Chart  : [employer]  EXPIRATION DATE: JUN 2022

## 2021-03-19 NOTE — LETTER
3/19/2021       RE: Luisana Patterson  [address]  [place] [address]     Dear Colleague,    Thank you for referring your patient, Luisana Patterson, to the [facility] DERMATOLOGY [facility]IN[facility] [place] at [facility]. Please see a copy of my visit note below.    CHIEF COMPLAINT:  Hidradenitis and acne.      DERMATOLOGY PROBLEM LIST:   1.  Hidradenitis suppurativa, status post wide resection of bilateral axillary vaults.   2.  Pilonidal cyst excision x2.   3.  Hidradenitis suppurativa in the inguinal creases and labia.  Past treatments have included rifampin, clindamycin and doxycycline, all leading to severe vulvovaginal candidiasis, currently on Humira weekly.   4.  Acne vulgaris on tretinoin 0.025% cream.      ASSESSMENT AND PLAN:   1.  Hidradenitis suppurativa, Maldonado stage 2 in the genital area.  Past reports of Maldonado stage 2/3 in the axilla.  She is status post excisions by plastic surgery in [place] for the axillary areas but has had ongoing disease over the mons pubis, labia and perirectal buttock.  The patient notes that she continues to have intermittent flares.  She describes Kenalog injections are helpful but cause severe pain and she dislikes them.  She continues Humira 40 mg subcutaneous weekly but continues to have worsened breakthrough nodules.  For this reason, we discussed transitioning to infliximab.  Infliximab orders were placed in the therapy plan for her today with infusions of 5 mg/kg on day 1 and 14 and then every 8 weeks thereafter.  We briefly discussed isotretinoin, which may be helpful for both her acne as well as her hidradenitis, which she prefers to avoid other oral medications and for this reason also prefers to stay off of spironolactone, which she had previously taken for acne.  A total of 0.1 mL of Kenalog 20 mg/mL was injected into a single inflammatory nodule on the right mons pubis today.     2.  Hypertrophic scars in  the bilateral axillary vaults, the sites of axillary resection.  These are limiting range of motion in the arms.  A total of 0.3 mL of Kenalog 20 were injected into 3 sites in the left axillary vault and areas of scar hypertrophy.  We will reassess at next visit.       3.  Acne vulgaris, comedonal and inflammatory with post-inflammatory hyperpigmentation.  Luisana notes that she prefers topical therapies.  She is using tretinoin 0.025% cream but has only been doing this consistently for 2-3 weeks.  She thinks that she has already seen benefit.  She would like to continue this for at least a 6 to 8-week trial before transitioning treatment.  I noted that she may also use this on the chest and back.   4.  Irritant versus atopic dermatitis on the mons pubis and in the gluteal cleft.  Xerosis noted today.  Advised ongoing use of gentle soaps with daily use of an emollient such as Aquaphor, CeraVe or Cetaphil.  I prescribed triamcinolone 0.025% ointment to be used twice daily to help with the dermatitis and itch.  After that, transition to gentle skin cares with emollient.      The patient is going to be seen by Dr. Doshi in [place] this summer and I advised Luisana to return to see me when she comes back to [place] in August.  In the interim, I will work on prior authorization for her infliximab infusions and she may message me with any questions in the interim.  Thank you for referring this patient.      Carey Ferrari MD   of Dermatology  Division of Pediatric Dermatology  [facility]    _______________________________________  _______________________________________           HISTORY OF PRESENT ILLNESS:  Luisana is a 20-year-old female presenting to Dermatology Clinic, seen at the request of Dr. Serafin Doshi, in the setting of her hidradenitis suppurativa and acne vulgaris.  She notes that she has been struggling with severe hidradenitis for most of her teen years.  Past treatments  have included intralesional Kenalog, oral spironolactone, oral doxycycline, clindamycin and rifampin and surgical resection of the bilateral axillary vaults.  She has had 2 surgical resections of pilonidal cysts.  Luisana notes that Humira 40 mg weekly has been the most effective for treating her hidradenitis, but she continues to have breakthrough flares in the labia and mons pubis areas.  She states that all oral antibiotics cause immediate vaginal yeast infections and prefers to avoid these.  She did not notice significant benefit with her hidradenitis on spironolactone.  She would like to consider other treatment options.  For her acne, she was prescribed tretinoin 0.025% cream and then 0.5% cream.  She had excess dryness with the stronger preparation and so has now been using tretinoin 0.025% consistently for the last 3 weeks.  She notes significant pruritus in the inguinal creases, the mons pubis and the gluteal cleft.  She describes a history of atopic dermatitis when younger.  She has been using Dove sensitive soap and inconsistently applying shea moisturizer to these areas.  She notes that her recent COVID vaccine caused a flare of hidradenitis in the left arm but this has resolved.  She does have range of motion limitations in her arms related to surgical resection of the axillae.      PAST MEDICAL HISTORY:   1.  Atopic dermatitis.   2.  Hidradenitis suppurativa.   3.  Acne vulgaris.      REVIEW OF SYSTEMS:  A 10-point review of systems was collected and positive for itch of the skin and pain of the labia.      PHYSICAL EXAMINATION:   GENERAL:  The patient is a healthy-appearing 20-year-old female in no distress.   HEENT:  Conjunctivae clear.   PULMONARY:  Breathing comfortably on room air.   SKIN:  Examination today included the axillae, chest, back, face and genital area.  Skin exam normal except for as follows:   -- Examination of the bilateral lateral cheeks with few inflammatory pink papules and  scattered open comedones with few open comedones on the forehead.   -- Surgical scars in the bilateral axillary vaults with firm nodules at the anterior and posterior edge of surgical scar in the left axilla.   -- Scattered open comedones and brown macules on the central chest to the superior shoulders and upper back with background of xerosis.   -- Xerosis and mild erythema over the mons pubis.   -- Erythema and superficial erosion in the mid gluteal cleft.   -- Scattered nodules and pink papules on the mons pubis and bilateral external labia majora with an inflamed glossy pink papule on the left medial inferior buttock.      cc:   Serafin Doshi MD   [facility]   [address] [place] [address], [address] [place] [address]

## 2021-03-19 NOTE — NURSING NOTE
Chief Complaint   Patient presents with     Derm Problem     Nasreen kwong, is here for her HS, pt has been dealing with this for 6 years. Pt also has acne.     Montrell Garcia EMT

## 2021-03-19 NOTE — PROGRESS NOTES
CHIEF COMPLAINT:  Hidradenitis and acne.      DERMATOLOGY PROBLEM LIST:   1.  Hidradenitis suppurativa, status post wide resection of bilateral axillary vaults.   2.  Pilonidal cyst excision x2.   3.  Hidradenitis suppurativa in the inguinal creases and labia.  Past treatments have included rifampin, clindamycin and doxycycline, all leading to severe vulvovaginal candidiasis, currently on Humira weekly.   4.  Acne vulgaris on tretinoin 0.025% cream.      ASSESSMENT AND PLAN:   1.  Hidradenitis suppurativa, Maldonado stage 2 in the genital area.  Past reports of Maldonado stage 2/3 in the axilla.  She is status post excisions by plastic surgery in [place] for the axillary areas but has had ongoing disease over the mons pubis, labia and perirectal buttock.  The patient notes that she continues to have intermittent flares.  She describes Kenalog injections are helpful but cause severe pain and she dislikes them.  She continues Humira 40 mg subcutaneous weekly but continues to have worsened breakthrough nodules.  For this reason, we discussed transitioning to infliximab.  Infliximab orders were placed in the therapy plan for her today with infusions of 5 mg/kg on day 1 and 14 and then every 8 weeks thereafter.  We briefly discussed isotretinoin, which may be helpful for both her acne as well as her hidradenitis, which she prefers to avoid other oral medications and for this reason also prefers to stay off of spironolactone, which she had previously taken for acne.  A total of 0.1 mL of Kenalog 20 mg/mL was injected into a single inflammatory nodule on the right mons pubis today.     2.  Hypertrophic scars in the bilateral axillary vaults, the sites of axillary resection.  These are limiting range of motion in the arms.  A total of 0.3 mL of Kenalog 20 were injected into 3 sites in the left axillary vault and areas of scar hypertrophy.  We will reassess at next visit.       3.  Acne vulgaris, comedonal and inflammatory with  post-inflammatory hyperpigmentation.  Luisana notes that she prefers topical therapies.  She is using tretinoin 0.025% cream but has only been doing this consistently for 2-3 weeks.  She thinks that she has already seen benefit.  She would like to continue this for at least a 6 to 8-week trial before transitioning treatment.  I noted that she may also use this on the chest and back.   4.  Irritant versus atopic dermatitis on the mons pubis and in the gluteal cleft.  Xerosis noted today.  Advised ongoing use of gentle soaps with daily use of an emollient such as Aquaphor, CeraVe or Cetaphil.  I prescribed triamcinolone 0.025% ointment to be used twice daily to help with the dermatitis and itch.  After that, transition to gentle skin cares with emollient.      The patient is going to be seen by Dr. Doshi in [place] this summer and I advised Luisana to return to see me when she comes back to [place] in August.  In the interim, I will work on prior authorization for her infliximab infusions and she may message me with any questions in the interim.  Thank you for referring this patient.      Carey Ferrari MD   of Dermatology  Division of Pediatric Dermatology  [facility]    _______________________________________  _______________________________________           HISTORY OF PRESENT ILLNESS:  Luisana is a 20-year-old female presenting to Dermatology Clinic, seen at the request of Dr. Serafin Doshi, in the setting of her hidradenitis suppurativa and acne vulgaris.  She notes that she has been struggling with severe hidradenitis for most of her teen years.  Past treatments have included intralesional Kenalog, oral spironolactone, oral doxycycline, clindamycin and rifampin and surgical resection of the bilateral axillary vaults.  She has had 2 surgical resections of pilonidal cysts.  Luisana notes that Humira 40 mg weekly has been the most effective for treating her hidradenitis, but she  continues to have breakthrough flares in the labia and mons pubis areas.  She states that all oral antibiotics cause immediate vaginal yeast infections and prefers to avoid these.  She did not notice significant benefit with her hidradenitis on spironolactone.  She would like to consider other treatment options.  For her acne, she was prescribed tretinoin 0.025% cream and then 0.5% cream.  She had excess dryness with the stronger preparation and so has now been using tretinoin 0.025% consistently for the last 3 weeks.  She notes significant pruritus in the inguinal creases, the mons pubis and the gluteal cleft.  She describes a history of atopic dermatitis when younger.  She has been using Dove sensitive soap and inconsistently applying shea moisturizer to these areas.  She notes that her recent COVID vaccine caused a flare of hidradenitis in the left arm but this has resolved.  She does have range of motion limitations in her arms related to surgical resection of the axillae.      PAST MEDICAL HISTORY:   1.  Atopic dermatitis.   2.  Hidradenitis suppurativa.   3.  Acne vulgaris.      REVIEW OF SYSTEMS:  A 10-point review of systems was collected and positive for itch of the skin and pain of the labia.      PHYSICAL EXAMINATION:   GENERAL:  The patient is a healthy-appearing 20-year-old female in no distress.   HEENT:  Conjunctivae clear.   PULMONARY:  Breathing comfortably on room air.   SKIN:  Examination today included the axillae, chest, back, face and genital area.  Skin exam normal except for as follows:   -- Examination of the bilateral lateral cheeks with few inflammatory pink papules and scattered open comedones with few open comedones on the forehead.   -- Surgical scars in the bilateral axillary vaults with firm nodules at the anterior and posterior edge of surgical scar in the left axilla.   -- Scattered open comedones and brown macules on the central chest to the superior shoulders and upper back with  background of xerosis.   -- Xerosis and mild erythema over the mons pubis.   -- Erythema and superficial erosion in the mid gluteal cleft.   -- Scattered nodules and pink papules on the mons pubis and bilateral external labia majora with an inflamed glossy pink papule on the left medial inferior buttock.      cc:   Serafin Doshi MD   [facility]   [address] [place] [address], [address]   [place] [id number]

## 2021-03-26 ENCOUNTER — TELEPHONE (OUTPATIENT)
Dept: DERMATOLOGY | Facility: CLINIC | Age: 21
End: 2021-03-26

## 2021-03-26 NOTE — TELEPHONE ENCOUNTER
----- Message from Carey Ferrari MD sent at 3/23/2021 10:47 AM CDT -----  Regarding: new therapy plan  Hi derm team. I saw this patient on Friday and entered a therapy plan for infliximab. Do I need to message the infusion center or who will investigate coverage?  Thanks for info,   Carey

## 2021-03-29 NOTE — TELEPHONE ENCOUNTER
[place], Ethan  You 3 days ago     Yobani Sandoval,     Remicade is off-label to treat HS. Would it be possible to provide a Letter of Medical Necessity to submit along with a PA?     Turnaround once submitted is 2 weeks.

## 2021-05-05 NOTE — TELEPHONE ENCOUNTER
Prior authorization approved.  recommends moving forward with scheduling.      Thank you,  Radha Ledesma [facility]   - Infusion Therapy  Office: [phone]  Fax: [phone]

## 2021-05-07 DIAGNOSIS — Z20.822 ENCOUNTER FOR LABORATORY TESTING FOR COVID-19 VIRUS: Primary | ICD-10-CM

## 2021-09-17 ENCOUNTER — OFFICE VISIT (OUTPATIENT)
Dept: DERMATOLOGY | Facility: CLINIC | Age: 21
End: 2021-09-17
Payer: COMMERCIAL

## 2021-09-17 DIAGNOSIS — L73.2 HIDRADENITIS SUPPURATIVA: ICD-10-CM

## 2021-09-17 DIAGNOSIS — L70.0 ACNE VULGARIS: Primary | ICD-10-CM

## 2021-09-17 DIAGNOSIS — Z51.81 MEDICATION MONITORING ENCOUNTER: ICD-10-CM

## 2021-09-17 PROCEDURE — 99214 OFFICE O/P EST MOD 30 MIN: CPT | Performed by: DERMATOLOGY

## 2021-09-17 RX ORDER — SPIRONOLACTONE 100 MG/1
100 TABLET, FILM COATED ORAL DAILY
Qty: 90 TABLET | Refills: 3 | Status: SHIPPED | OUTPATIENT
Start: 2021-09-17

## 2021-09-17 ASSESSMENT — PAIN SCALES - GENERAL: PAINLEVEL: NO PAIN (0)

## 2021-09-17 NOTE — LETTER
Date:November 5, 2021      Patient was self referred, no letter generated. Do not send.        [facility] Health Information

## 2021-09-17 NOTE — LETTER
9/17/2021       RE: Luisana Patterson  [address]  [place] [address]     Dear Colleague,    Thank you for referring your patient, Luisana Patterson, to the [facility] DERMATOLOGY [facility] [place] at [facility]. Please see a copy of my visit note below.    [facility] Dermatology Note  Encounter Date: Sep 17, 2021  Office Visit     Dermatology Problem List:  1. Hidradenitis suppurativa  - Maldonado stage 2 of the mons pubis  - History of Maldonado stage 2/3 in the axilla, s/p resection in 2020  - Patient also sees Dr. Doshi in [place]  - Current: Humira 40 mg weekly  - Past: ILK, surgical resection of bilateral axilla  - Future: Infliximab pending access on study abroad trip    2. Acne vulgaris, comedonal and inflammatory w/ PIH  - Current: tretinoin 0.05% external cream, Spironolactone 50 mg daily  - Previous: topical clindamycin, oral doxy  ____________________________________________    Assessment & Plan:    # Hidradenitis suppurativa  - Maldonado stage 2 of the mons pubis  - History of Maldonado stage 2/3 in the axilla, s/p resection in 2020  - Patient also sees Dr. Doshi in [place]  - Current tx: Humira 40 mg weekly (started 2018)   - Previous tx: intralesional injections w/ Kenalog 20 mg/ml  - Patient does not want ILK today  - No new lesions since last seen by Dr. Ferrari  - Remicade orders have been approved. 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 Will likely continue Humira and attempt Remicade after she returns. However, patient will contact  and see if either medication (Humira or Remicade) is more readily available abroad. We will recommend whichever medication is accesible  - Ordered TB quant today to be completed prior to starting Remicade if approved for study abroad program    # Acne vulgaris, comedonal and inflammatory w/ PIH  - Continuing to develop new comedones and nodules  - Current: tretinoin  0.05% external cream, 4-5 times per week   - Previous: topical clindamyciin oral doxy 100 mg BID  - Discussed spironolactone, good effects in the past however struggled w/ compliance. Patient prefers once daily dosing  - Begin Spironolactone 100 mg daily    Procedures Performed:   None    Follow-up: 3 month(s) in-person, or earlier for new or changing lesions    Staff and Medical Student:     I, Itzel Brothers, saw and staffed this patient with Dr. Ferrari    I was present with the medical student who participated in the service and in the documentation of the note.  I have verified the history and personally performed the physical exam and medical decision making.  I agree with the assessment and plan of care as documented in the note.    Carey Ferrari MD   of Dermatology  [facility]      ____________________________________________    CC: Follow Up (HS)    HPI:  Ms. Luisana Patterson is a(n) 20 year old female who presents today for follow-up  for HS and ace, mixed-type.     Patient has been taking Humira consistently w/ fairy good effects. She has not developed any new lesions since piror to her last visit with Dr. Ferrari. Today, she does not have any active lesion or track in her axilla, and maybe 3-5 healing lesions in the groin area. She is very happy with the results of her sinus resection in the bilateral axillas, and thinks overall her lesions are improving.     She notes several months ago, her insurance lapsed and she was unable to receive two doses. Since that time, Humira has not seemed to work as well for her. Patient was scheduled to begin Remicade infusions in August, however was unable to make the scheduled appointment and had some concerns regarding upcoming study abroad trip.  In January 2022, patient is leaving for [place] to spend a semester abroad. She is nervous about starting a new infusion treatment and wonders if it is available in [place].     Patient began using  tretinoin consistently about 2 or 3 months ago, has noticed some improvement. Applies moisturizer 30 minutes prior to tretinoin to mitigate dryness. Her acne does tend to flare with her periods, discussed Sprinolactone. She said this medication helped her in the past however she struggled with taking a pill twice daily.    Patient is otherwise feeling well, without additional skin concerns.    Labs:  None reviewed.    Physical Exam:  Vitals: There were no vitals taken for this visit.  SKIN: Focused examination of the axilla, face, and groin area was performed.  - Scattered nodules and 3-4 glossy pink papules on the mons pubis and bilateral external labia majora   - Surgical scars in the bilateral axillary vaults with firm nodules at the anterior and posterior edge of surgical scar in the left axilla. No areas of active inflammation  - few inflammatory pink papules and scattered open comedones on the cheeks bilaterally. Scattered hyperpigmented macules and scars consistent with post inflammatory hyperpigmentation 2/2 acne lesions  - No other lesions of concern on areas examined.     Medications:  Current Outpatient Medications   Medication     acetaminophen (TYLENOL) 325 MG tablet     adalimumab (HUMIRA) 40 MG/0.8ML prefilled syringe kit     diltiazem 2% in PLO gel     doxycycline hyclate (VIBRA-TABS) 100 MG tablet     senna-docusate (SENOKOT-S/PERICOLACE) 8.6-50 MG tablet     SUMAtriptan (IMITREX) 50 MG tablet     tretinoin (RETIN-A) 0.05 % external cream     triamcinolone (KENALOG) 0.025 % external ointment     oxyCODONE (ROXICODONE) 5 MG tablet     No current facility-administered medications for this visit.      Past Medical History:   Patient Active Problem List   Diagnosis     Abscess of sacrum (H)     Pilonidal abscess     Hidradenitis suppurativa     No past medical history on file.     CC Referred Self, MD  No address on file on close of this encounter.      Again, thank you for allowing me to participate in  the care of your patient.      Sincerely,    Carey Ferrari MD

## 2021-09-17 NOTE — PATIENT INSTRUCTIONS
Try to reach out to  and see if they have any recommendations for continuing this medication abroad. Please let us know if there is anything we can do to help with this.     Recommend COVID booster vaccine.     We will start the new oral medication, Spironolactone. Take once daily whenever works best for you. If you feel dizzy, please let us know. Can always take it at bedtime.     Continue using topical tretinoin, you're doing great.    See us before you leave to study abroad and we will make sure you are set with all of your appropriate medications.

## 2021-09-17 NOTE — NURSING NOTE
Chief Complaint   Patient presents with     Follow Up     HS     Pt states she has a flare in her groin area.    Pilar Manrique LPN on 9/17/2021 at 9:23 AM

## 2021-09-17 NOTE — PROGRESS NOTES
[facility] Dermatology Note  Encounter Date: Sep 17, 2021  Office Visit     Dermatology Problem List:  1. Hidradenitis suppurativa  - Maldonado stage 2 of the mons pubis  - History of Maldonado stage 2/3 in the axilla, s/p resection in 2020  - Patient also sees Dr. Doshi in [place]  - Current: Humira 40 mg weekly  - Past: ILK, surgical resection of bilateral axilla  - Future: Infliximab pending access on study abroad trip    2. Acne vulgaris, comedonal and inflammatory w/ PIH  - Current: tretinoin 0.05% external cream, Spironolactone 50 mg daily  - Previous: topical clindamycin, oral doxy  ____________________________________________    Assessment & Plan:    # Hidradenitis suppurativa  - Maldonado stage 2 of the mons pubis  - History of Maldonado stage 2/3 in the axilla, s/p resection in 2020  - Patient also sees Dr. Doshi in [place]  - Current tx: Humira 40 mg weekly (started 2018)   - Previous tx: intralesional injections w/ Kenalog 20 mg/ml  - Patient does not want ILK today  - No new lesions since last seen by Dr. Ferrari  - Remicade orders have been approved. 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 Will likely continue Humira and attempt Remicade after she returns. However, patient will contact  and see if either medication (Humira or Remicade) is more readily available abroad. We will recommend whichever medication is accesible  - Ordered TB quant today to be completed prior to starting Remicade if approved for study abroad program    # Acne vulgaris, comedonal and inflammatory w/ PIH  - Continuing to develop new comedones and nodules  - Current: tretinoin 0.05% external cream, 4-5 times per week   - Previous: topical clindamyciin oral doxy 100 mg BID  - Discussed spironolactone, good effects in the past however struggled w/ compliance. Patient prefers once daily dosing  - Begin Spironolactone 100 mg daily    Procedures Performed:   None    Follow-up: 3 month(s) in-person,  or earlier for new or changing lesions    Staff and Medical Student:     I, Itzel Brothers, saw and staffed this patient with Dr. Ferrari    I was present with the medical student who participated in the service and in the documentation of the note.  I have verified the history and personally performed the physical exam and medical decision making.  I agree with the assessment and plan of care as documented in the note.    Carey Ferrari MD   of Dermatology  [facility]      ____________________________________________    CC: Follow Up (HS)    HPI:  Ms. Luisana Patterson is a(n) 20 year old female who presents today for follow-up  for HS and ace, mixed-type.     Patient has been taking Humira consistently w/ fairy good effects. She has not developed any new lesions since piror to her last visit with Dr. Ferrari. Today, she does not have any active lesion or track in her axilla, and maybe 3-5 healing lesions in the groin area. She is very happy with the results of her sinus resection in the bilateral axillas, and thinks overall her lesions are improving.     She notes several months ago, her insurance lapsed and she was unable to receive two doses. Since that time, Humira has not seemed to work as well for her. Patient was scheduled to begin Remicade infusions in August, however was unable to make the scheduled appointment and had some concerns regarding upcoming study abroad trip.  In January 2022, patient is leaving for [place] [facility] to spend a semester abroad. She is nervous about starting a new infusion treatment and wonders if it is available in [place] [facility].     Patient began using tretinoin consistently about 2 or 3 months ago, has noticed some improvement. Applies moisturizer 30 minutes prior to tretinoin to mitigate dryness. Her acne does tend to flare with her periods, discussed Sprinolactone. She said this medication helped her in the past however she struggled with taking a pill twice  daily.    Patient is otherwise feeling well, without additional skin concerns.    Labs:  None reviewed.    Physical Exam:  Vitals: There were no vitals taken for this visit.  SKIN: Focused examination of the axilla, face, and groin area was performed.  - Scattered nodules and 3-4 glossy pink papules on the mons pubis and bilateral external labia majora   - Surgical scars in the bilateral axillary vaults with firm nodules at the anterior and posterior edge of surgical scar in the left axilla. No areas of active inflammation  - few inflammatory pink papules and scattered open comedones on the cheeks bilaterally. Scattered hyperpigmented macules and scars consistent with post inflammatory hyperpigmentation 2/2 acne lesions  - No other lesions of concern on areas examined.     Medications:  Current Outpatient Medications   Medication     acetaminophen (TYLENOL) 325 MG tablet     adalimumab (HUMIRA) 40 MG/0.8ML prefilled syringe kit     diltiazem 2% in PLO gel     doxycycline hyclate (VIBRA-TABS) 100 MG tablet     senna-docusate (SENOKOT-S/PERICOLACE) 8.6-50 MG tablet     SUMAtriptan (IMITREX) 50 MG tablet     tretinoin (RETIN-A) 0.05 % external cream     triamcinolone (KENALOG) 0.025 % external ointment     oxyCODONE (ROXICODONE) 5 MG tablet     No current facility-administered medications for this visit.      Past Medical History:   Patient Active Problem List   Diagnosis     Abscess of sacrum (H)     Pilonidal abscess     Hidradenitis suppurativa     No past medical history on file.     CC Referred Self, MD  No address on file on close of this encounter.

## 2021-10-11 ENCOUNTER — HEALTH MAINTENANCE LETTER (OUTPATIENT)
Age: 21
End: 2021-10-11

## 2021-10-19 ENCOUNTER — TELEPHONE (OUTPATIENT)
Dept: DERMATOLOGY | Facility: CLINIC | Age: 21
End: 2021-10-19

## 2021-10-19 NOTE — TELEPHONE ENCOUNTER
PRIOR AUTHORIZATION DENIED    Medication: Remicade - Rx Benefit    Denial Date: 10/19/2021    Denial Rational: Plan/benefit exclusion under pharmacy benefit    Appeal Information: N/A

## 2021-12-02 ENCOUNTER — TELEPHONE (OUTPATIENT)
Dept: DERMATOLOGY | Facility: CLINIC | Age: 21
End: 2021-12-02
Payer: COMMERCIAL

## 2021-12-02 NOTE — TELEPHONE ENCOUNTER
[facility] Call Center    Phone Message    May a detailed message be left on voicemail: yes     Reason for Call: Appointment Intake    Referring Provider Name:   Dr Ferrari    Diagnosis and/or Symptoms:   3 mo Follow-up HS    Action Taken: Message routed to:  [facility] Derm    Travel Screening: Not Applicable    Pt has an exam on 12/3/21. Pt is trying to ask Professor if she can make any changes to accommodate appt.    Pt wants to know if she can see Dr Ferrari next week instead.    Pt will be leaving the state on 12/18/21 and leaving the country until 7/2022.    Scheduling did not see availability with Dr Ferrari in [facility] or [facility] until 2/2022    Please call Pt to advise.

## 2021-12-02 NOTE — TELEPHONE ENCOUNTER
Called pt LVM to notify pt that [facility] does not have any available appt for 12-10-21 at this time.     Yola Pizarro MA

## 2022-01-30 ENCOUNTER — HEALTH MAINTENANCE LETTER (OUTPATIENT)
Age: 22
End: 2022-01-30

## 2022-09-24 ENCOUNTER — HEALTH MAINTENANCE LETTER (OUTPATIENT)
Age: 22
End: 2022-09-24

## 2023-02-07 NOTE — LETTER
"2020       RE: Luisana Patterson  [address]  [place] [address]     Dear Colleague,    Thank you for referring your patient, Luisana Patterson, to the [facility] COLON AND RECTAL SURGERY CLINIC [place] at [facility]. Please see a copy of my visit note below.    Colon and Rectal Surgery Consult Clinic Note    Date: 2020     Referring provider:  No referring provider defined for this encounter.     RE: Luisana Patterson  : 2000  CHRIS: 2020    Luisana Patterson is a very pleasant 20 year old female with hidradenitis suppurativa on Humira who presents today for rectal bleeding.    HPI: Luisana reports that her HS is not well controlled. She plans to establish care with dermatology here in [place] since she attends school here but continues to follow with her dermatologist in [place]. She is returning to [place] tomorrow and has scheduled follow up with him. She is seeing Dr. Ferrari in January here. She has had disease mostly axillary but is now having some difficulty with lesions in her groin. She has had two spots at her posterior labia in the past. She has had some bright red blood without any open lesions and thinks this is coming from her anus. She also has sharp pain with bowel movements and every time she has the pain, she notices bright red blood. She gets constipated before her menses and this seems to make it worse. No prolapsing tissue. She saw blood as recently as last week. She spoke with her PCP who planned to get her set up for a colonoscopy in [place] when she is home in the next few weeks due to the bleeding.    Physical Examination:  /73 (BP Location: Left arm, Patient Position: Sitting, Cuff Size: Adult Regular)   Pulse 80   Temp 97.9  F (36.6  C) (Oral)   Ht 5' 7\"   Wt 143 lb 9.6 oz   SpO2 100%   BMI 22.49 kg/m    General: alert, oriented, in no acute distress, sitting comfortably  HEENT: mucous membranes moist  Perianal " [facility] Medical form signed and faxed back 2/7/2023 external examination: Exam was chaperoned by Haleigh Madrigal MA   Perianal skin: Intact with no excoriation or lichenification.  Lesions: No evidence of an external lesion, nodularity, or induration in the perianal region.  Eversion of buttocks: There was evidence of an anal fissure. Details: anterior midline anal fissure with small associated skin tag.    Digital rectal examination: Was deferred in order not to cause further trauma    Anoscopy: Was deferred in order not to cause further trauma    Assessment/Plan: 20 year old female with hidradenitis suppurativa and anal fissure. I discussed that the fissure is likely the source of the pain and bleeding. Discussed the importance of keeping stools soft and easy to pass while healing fissure.  Recommended starting on a daily fiber supplement and can add in a laxative in addition as needed.  Will treat with topical diltiazem with follow up in 8 weeks. Discussed that it may take several weeks for symptoms to improve. If no improvement is noted after 4 weeks, return to clinic and discuss further intervention such as Botox injections.  Advised the use of Tylenol, ibuprofen, and warm tub baths for any pain.  If bleeding persist despite treatment of fissure, would recommend a colonoscopy but I think she could hold off for now on getting a colonoscopy. Patient's questions were answered to her stated satisfaction and she is in agreement with this plan.    Medical history:  No past medical history on file.    Surgical history:  No past surgical history on file.    Problem list:  There are no active problems to display for this patient.      Medications:  Current Outpatient Medications   Medication Sig Dispense Refill     adalimumab (HUMIRA) 40 MG/0.8ML prefilled syringe kit Inject 40 mg Subcutaneous every 7 days       diltiazem 2% in PLO gel Apply small amount to anal opening three times daily for 8 weeks. 60 g 0     naproxen (NAPROSYN) 250 MG tablet Take 250 mg by mouth 2  "times daily (with meals)       SUMAtriptan (IMITREX) 50 MG tablet Take 50 mg by mouth       tretinoin (RETIN-A) 0.05 % external cream Apply topically At Bedtime         Allergies:  No Known Allergies    Family history:  No family history on file.    Social history:  Social History     Tobacco Use     Smoking status: Never Smoker     Smokeless tobacco: Never Used   Substance Use Topics     Alcohol use: Never     Frequency: Never     Binge frequency: Never    Marital status: single.  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    Nursing Notes:   Haleigh An CMA  12/14/2020  3:50 PM  Signed  Chief Complaint   Patient presents with     New Patient     New consult for hemorrhoids.       Vitals:    12/14/20 1542   BP: 116/73   BP Location: Left arm   Patient Position: Sitting   Cuff Size: Adult Regular   Pulse: 80   Temp: 97.9  F (36.6  C)   TempSrc: Oral   SpO2: 100%   Weight: 143 lb 9.6 oz   Height: 5' 7\"       Body mass index is 22.49 kg/m .          Haleigh Penn CMA         Total face to face time was 20 minutes, >50% counseling.    KATALINA Oh, NP-C  Colon and Rectal Surgery   [facility]    This note was created using speech recognition software and may contain unintended word substitutions.        Again, thank you for allowing me to participate in the care of your patient.      Sincerely,    KATALINA Oh CNP      "

## 2023-05-08 ENCOUNTER — HEALTH MAINTENANCE LETTER (OUTPATIENT)
Age: 23
End: 2023-05-08

## 2024-07-14 ENCOUNTER — HEALTH MAINTENANCE LETTER (OUTPATIENT)
Age: 24
End: 2024-07-14

## (undated) DEVICE — SOL NACL 0.9% IRRIG 1000ML BOTTLE 2F7124

## (undated) DEVICE — SOL WATER IRRIG 1000ML BOTTLE 2F7114

## (undated) DEVICE — SUCTION CANISTER MEDIVAC LINER 3000ML W/LID 65651-530

## (undated) DEVICE — SU PDS II 3-0 SH 27" Z316H

## (undated) DEVICE — SUCTION TIP YANKAUER W/O VENT K86

## (undated) DEVICE — SPONGE BALL KERLIX ROUND XL W/O STRING LATEX 4935

## (undated) DEVICE — NDL 19GA 1.5"

## (undated) DEVICE — GLOVE PROTEXIS W/NEU-THERA 6.0  2D73TE60

## (undated) DEVICE — GLOVE PROTEXIS W/NEU-THERA 7.5  2D73TE75

## (undated) DEVICE — GOWN IMPERVIOUS SPECIALTY XLG/XLONG 32474

## (undated) DEVICE — GLOVE PROTEXIS BLUE W/NEU-THERA 6.5  2D73EB65

## (undated) DEVICE — ESU PENCIL W/SMOKE EVAC CVPLP2000

## (undated) DEVICE — LINEN TOWEL PACK X5 5464

## (undated) DEVICE — PACK MINOR SBA15MIFSE

## (undated) DEVICE — ESU ELEC BLADE 2.75" COATED/INSULATED E1455

## (undated) DEVICE — SYR BULB IRRIG 50ML LATEX FREE 0035280

## (undated) DEVICE — DRAPE MINOR PROCEDURE LAP 29496

## (undated) RX ORDER — PROPOFOL 10 MG/ML
INJECTION, EMULSION INTRAVENOUS
Status: DISPENSED
Start: 2021-01-26

## (undated) RX ORDER — FENTANYL CITRATE 50 UG/ML
INJECTION, SOLUTION INTRAMUSCULAR; INTRAVENOUS
Status: DISPENSED
Start: 2021-01-26

## (undated) RX ORDER — GINSENG 100 MG
CAPSULE ORAL
Status: DISPENSED
Start: 2021-01-26

## (undated) RX ORDER — DEXAMETHASONE SODIUM PHOSPHATE 4 MG/ML
INJECTION, SOLUTION INTRA-ARTICULAR; INTRALESIONAL; INTRAMUSCULAR; INTRAVENOUS; SOFT TISSUE
Status: DISPENSED
Start: 2021-01-26

## (undated) RX ORDER — BUPIVACAINE HYDROCHLORIDE 2.5 MG/ML
INJECTION, SOLUTION EPIDURAL; INFILTRATION; INTRACAUDAL
Status: DISPENSED
Start: 2021-01-26